# Patient Record
Sex: MALE | Race: WHITE | NOT HISPANIC OR LATINO | Employment: OTHER | URBAN - METROPOLITAN AREA
[De-identification: names, ages, dates, MRNs, and addresses within clinical notes are randomized per-mention and may not be internally consistent; named-entity substitution may affect disease eponyms.]

---

## 2018-08-31 ENCOUNTER — HOSPITAL ENCOUNTER (EMERGENCY)
Facility: HOSPITAL | Age: 73
Discharge: HOME | End: 2018-08-31
Attending: EMERGENCY MEDICINE | Admitting: EMERGENCY MEDICINE
Payer: MEDICARE

## 2018-08-31 ENCOUNTER — APPOINTMENT (EMERGENCY)
Dept: RADIOLOGY | Facility: HOSPITAL | Age: 73
End: 2018-08-31
Attending: EMERGENCY MEDICINE
Payer: MEDICARE

## 2018-08-31 VITALS
WEIGHT: 195 LBS | RESPIRATION RATE: 20 BRPM | OXYGEN SATURATION: 95 % | SYSTOLIC BLOOD PRESSURE: 149 MMHG | BODY MASS INDEX: 26.41 KG/M2 | DIASTOLIC BLOOD PRESSURE: 81 MMHG | TEMPERATURE: 98.5 F | HEART RATE: 95 BPM | HEIGHT: 72 IN

## 2018-08-31 DIAGNOSIS — R07.9 CHEST PAIN, UNSPECIFIED TYPE: Primary | ICD-10-CM

## 2018-08-31 PROBLEM — K86.2 PANCREATIC CYST: Status: ACTIVE | Noted: 2018-08-31

## 2018-08-31 PROBLEM — R91.1 PULMONARY NODULE: Status: ACTIVE | Noted: 2018-08-31

## 2018-08-31 PROBLEM — I10 HYPERTENSION: Status: ACTIVE | Noted: 2018-08-31

## 2018-08-31 PROBLEM — E13.9 DIABETES 1.5, MANAGED AS TYPE 2 (CMS/HCC): Status: ACTIVE | Noted: 2018-08-31

## 2018-08-31 PROBLEM — I48.91 ATRIAL FIBRILLATION (CMS/HCC): Status: ACTIVE | Noted: 2018-08-31

## 2018-08-31 PROBLEM — R07.81 PLEURITIC CHEST PAIN: Status: ACTIVE | Noted: 2018-08-31

## 2018-08-31 PROBLEM — K21.9 GERD (GASTROESOPHAGEAL REFLUX DISEASE): Status: ACTIVE | Noted: 2018-08-31

## 2018-08-31 LAB
ALBUMIN SERPL-MCNC: 4.5 G/DL (ref 3.4–5)
ALP SERPL-CCNC: 48 IU/L (ref 35–126)
ALT SERPL-CCNC: 32 IU/L (ref 16–63)
ANION GAP SERPL CALC-SCNC: 11 MEQ/L (ref 3–15)
APTT PPP: 32 SEC. (ref 23–35)
AST SERPL-CCNC: 34 IU/L (ref 15–41)
BASOPHILS # BLD: 0.04 K/UL (ref 0.01–0.1)
BASOPHILS NFR BLD: 0.4 %
BILIRUB DIRECT SERPL-MCNC: 0.2 MG/DL
BILIRUB SERPL-MCNC: 0.8 MG/DL (ref 0.3–1.2)
BNP SERPL-MCNC: 264 PG/ML
BUN SERPL-MCNC: 23 MG/DL (ref 8–20)
CALCIUM SERPL-MCNC: 9.2 MG/DL (ref 8.9–10.3)
CHLORIDE SERPL-SCNC: 103 MMOL/L (ref 98–109)
CO2 SERPL-SCNC: 24 MMOL/L (ref 22–32)
CREAT SERPL-MCNC: 1.4 MG/DL (ref 0.8–1.3)
DIFFERENTIAL METHOD BLD: ABNORMAL
EOSINOPHIL # BLD: 0.14 K/UL (ref 0.04–0.54)
EOSINOPHIL NFR BLD: 1.3 %
ERYTHROCYTE [DISTWIDTH] IN BLOOD BY AUTOMATED COUNT: 12.6 % (ref 11.6–14.4)
GFR SERPL CREATININE-BSD FRML MDRD: 49.4 ML/MIN/1.73M*2
GLUCOSE SERPL-MCNC: 179 MG/DL (ref 70–99)
HCT VFR BLDCO AUTO: 42.9 % (ref 40.1–51)
HGB BLD-MCNC: 14.9 G/DL (ref 13.7–17.5)
IMM GRANULOCYTES # BLD AUTO: 0.05 K/UL (ref 0–0.08)
IMM GRANULOCYTES NFR BLD AUTO: 0.5 %
INR PPP: 1 INR
LIPASE SERPL-CCNC: 47 U/L (ref 20–51)
LYMPHOCYTES # BLD: 1.67 K/UL (ref 1.2–3.5)
LYMPHOCYTES NFR BLD: 15.8 %
MCH RBC QN AUTO: 33.8 PG (ref 28–33.2)
MCHC RBC AUTO-ENTMCNC: 34.7 G/DL (ref 32.2–36.5)
MCV RBC AUTO: 97.3 FL (ref 83–98)
MONOCYTES # BLD: 1.14 K/UL (ref 0.3–1)
MONOCYTES NFR BLD: 10.8 %
NEUTROPHILS # BLD: 7.56 K/UL (ref 1.7–7)
NEUTS SEG NFR BLD: 71.2 %
NRBC BLD-RTO: 0 %
PDW BLD AUTO: 10.9 FL (ref 9.4–12.4)
PLATELET # BLD AUTO: 184 K/UL (ref 150–350)
POTASSIUM SERPL-SCNC: 4.1 MMOL/L (ref 3.6–5.1)
PROT SERPL-MCNC: 7.6 G/DL (ref 6–8.2)
PROTHROMBIN TIME: 13.3 SEC. (ref 12.2–14.5)
RBC # BLD AUTO: 4.41 M/UL (ref 4.5–5.8)
SODIUM SERPL-SCNC: 138 MMOL/L (ref 136–144)
TROPONIN I SERPL-MCNC: <0.02 NG/ML
TROPONIN I SERPL-MCNC: <0.02 NG/ML
WBC # BLD AUTO: 10.6 K/UL (ref 3.8–10.5)

## 2018-08-31 PROCEDURE — 99285 EMERGENCY DEPT VISIT HI MDM: CPT | Mod: 25

## 2018-08-31 PROCEDURE — 99285 EMERGENCY DEPT VISIT HI MDM: CPT | Performed by: HOSPITALIST

## 2018-08-31 PROCEDURE — 63600105 HC IODINE BASED CONTRAST: Performed by: EMERGENCY MEDICINE

## 2018-08-31 PROCEDURE — 71275 CT ANGIOGRAPHY CHEST: CPT

## 2018-08-31 PROCEDURE — 85025 COMPLETE CBC W/AUTO DIFF WBC: CPT | Performed by: EMERGENCY MEDICINE

## 2018-08-31 PROCEDURE — 84484 ASSAY OF TROPONIN QUANT: CPT | Performed by: EMERGENCY MEDICINE

## 2018-08-31 PROCEDURE — 96374 THER/PROPH/DIAG INJ IV PUSH: CPT

## 2018-08-31 PROCEDURE — 85610 PROTHROMBIN TIME: CPT | Performed by: EMERGENCY MEDICINE

## 2018-08-31 PROCEDURE — 80048 BASIC METABOLIC PNL TOTAL CA: CPT | Performed by: EMERGENCY MEDICINE

## 2018-08-31 PROCEDURE — 85730 THROMBOPLASTIN TIME PARTIAL: CPT | Performed by: EMERGENCY MEDICINE

## 2018-08-31 PROCEDURE — 83880 ASSAY OF NATRIURETIC PEPTIDE: CPT | Performed by: EMERGENCY MEDICINE

## 2018-08-31 PROCEDURE — 83690 ASSAY OF LIPASE: CPT | Performed by: EMERGENCY MEDICINE

## 2018-08-31 PROCEDURE — 36415 COLL VENOUS BLD VENIPUNCTURE: CPT | Performed by: EMERGENCY MEDICINE

## 2018-08-31 PROCEDURE — 71046 X-RAY EXAM CHEST 2 VIEWS: CPT

## 2018-08-31 PROCEDURE — 63600000 HC DRUGS/DETAIL CODE: Performed by: EMERGENCY MEDICINE

## 2018-08-31 PROCEDURE — 93005 ELECTROCARDIOGRAM TRACING: CPT | Performed by: EMERGENCY MEDICINE

## 2018-08-31 PROCEDURE — 3E033GC INTRODUCTION OF OTHER THERAPEUTIC SUBSTANCE INTO PERIPHERAL VEIN, PERCUTANEOUS APPROACH: ICD-10-PCS | Performed by: EMERGENCY MEDICINE

## 2018-08-31 PROCEDURE — 82248 BILIRUBIN DIRECT: CPT | Performed by: EMERGENCY MEDICINE

## 2018-08-31 PROCEDURE — 74174 CTA ABD&PLVS W/CONTRAST: CPT

## 2018-08-31 RX ORDER — ALFUZOSIN HYDROCHLORIDE 10 MG/1
10 TABLET, EXTENDED RELEASE ORAL NIGHTLY
COMMUNITY

## 2018-08-31 RX ORDER — VALSARTAN 160 MG/1
320 TABLET ORAL DAILY
COMMUNITY

## 2018-08-31 RX ORDER — ATORVASTATIN CALCIUM 20 MG/1
20 TABLET, FILM COATED ORAL NIGHTLY
COMMUNITY

## 2018-08-31 RX ORDER — KETOROLAC TROMETHAMINE 15 MG/ML
15 INJECTION, SOLUTION INTRAMUSCULAR; INTRAVENOUS ONCE
Status: COMPLETED | OUTPATIENT
Start: 2018-08-31 | End: 2018-08-31

## 2018-08-31 RX ORDER — METFORMIN HYDROCHLORIDE 500 MG/1
1000 TABLET ORAL 2 TIMES DAILY WITH MEALS
COMMUNITY

## 2018-08-31 RX ORDER — TADALAFIL 5 MG/1
20 TABLET ORAL DAILY PRN
COMMUNITY

## 2018-08-31 RX ORDER — HYDROMORPHONE HYDROCHLORIDE 1 MG/ML
0.5 INJECTION, SOLUTION INTRAMUSCULAR; INTRAVENOUS; SUBCUTANEOUS ONCE
Status: COMPLETED | OUTPATIENT
Start: 2018-08-31 | End: 2018-08-31

## 2018-08-31 RX ORDER — FINASTERIDE 5 MG/1
5 TABLET, FILM COATED ORAL DAILY
COMMUNITY

## 2018-08-31 RX ORDER — CYCLOBENZAPRINE HCL 10 MG
10 TABLET ORAL 2 TIMES DAILY PRN
Qty: 10 TABLET | Refills: 0 | Status: SHIPPED | OUTPATIENT
Start: 2018-08-31

## 2018-08-31 RX ORDER — OMEPRAZOLE 20 MG/1
40 CAPSULE, DELAYED RELEASE ORAL
COMMUNITY

## 2018-08-31 RX ORDER — AMLODIPINE BESYLATE 2.5 MG/1
7.5 TABLET ORAL DAILY
COMMUNITY

## 2018-08-31 RX ORDER — AZITHROMYCIN 250 MG/1
TABLET, FILM COATED ORAL
Qty: 6 TABLET | Refills: 0 | Status: SHIPPED | OUTPATIENT
Start: 2018-08-31

## 2018-08-31 RX ADMIN — HYDROMORPHONE HYDROCHLORIDE 0.5 MG: 1 INJECTION, SOLUTION INTRAMUSCULAR; INTRAVENOUS; SUBCUTANEOUS at 12:16

## 2018-08-31 RX ADMIN — KETOROLAC TROMETHAMINE 15 MG: 15 INJECTION, SOLUTION INTRAMUSCULAR; INTRAVENOUS at 16:36

## 2018-08-31 RX ADMIN — IOHEXOL 128 ML: 300 INJECTION, SOLUTION INTRAVENOUS at 13:44

## 2018-08-31 ASSESSMENT — ENCOUNTER SYMPTOMS
HEADACHES: 0
ABDOMINAL PAIN: 0
SHORTNESS OF BREATH: 1
DYSURIA: 0

## 2018-08-31 NOTE — ASSESSMENT & PLAN NOTE
Suspect costochondritis/ MSK due to tenderness to palpation along sternal border. Recommend short course of NSAIDS (caution given renal insufficiency, would not continue > 5 days) and muscle relaxer such as flexeril  Given abnormal CT findings, cannot rule out early /mild bronchitis, could also rx azithromycin x 5 days to be safe   If pain continues pt should follow up with cardiology for echo, r/o pericarditis or effusion  Would repeat troponin prior to discharge but low suspicion for ACS  Please give pt incentive spirometer to take home. I discussed importance of deep breaths to prevent pneumonia

## 2018-08-31 NOTE — CONSULTS
"   Hospital Medicine Service -  Inpatient Consultation         Requesting Physician: Dr Daigle    Reason for Consultation: Medical comanagement     HISTORY OF PRESENT ILLNESS        This is a 73 y.o. male with pmh HTN, AF on eliquis, BPH, DM who is visiting family in [place], [place] from [place] who presents with acute onset of pleuritic- type chest pain. Pt reports he was swimming yesterday and had sudden sharp pain in his chest that he attributed to the cold water. The pain subsided but was present when he woke up this morning. He denies dyspnea but says \"the pain makes me feel short of breath because I cant take deep breaths\". His wife was concerned about his heart and so brought him to the ED. Currently he reports pain that is sharp located along R sternal border only with deep breaths or movement. He denies cough, fever, chills, rhinorrhea, sore throat or indigestion. He is supposed to drive back to [place] tomorrow with his wife. He denies chest pain in the past. He has no known CAD and has never had stress test that he can remember. He does see a cardiologist Dr Sellers in [facility].     PAST MEDICAL AND SURGICAL HISTORY        Past Medical History:   Diagnosis Date   • Atrial fibrillation (CMS/HCC) (Formerly [facility])    • Hypertension    • Lipid disorder    • Type 2 diabetes mellitus (CMS/HCC) (Formerly [facility])        Past Surgical History:   Procedure Laterality Date   • BACK SURGERY     • HERNIA REPAIR     • TONSILLECTOMY         PCP: Mahamed Dunham    MEDICATIONS        Home Medications:    (Not in a hospital admission)    Current inpatient medications were personally reviewed.    ALLERGIES        Patient has no known allergies.    FAMILY HISTORY        Family History   Problem Relation Age of Onset   • Frontotemporal dementia Mother    • Diabetes Mother    • Rheumatic fever Father        SOCIAL HISTORY        Social History     Social History   • Marital status:      Spouse name: N/A   • Number of children: N/A   • Years of education: " N/A     Social History Main Topics   • Smoking status: Former Smoker     Packs/day: 1.00     Years: 14.00     Quit date: 1971   • Smokeless tobacco: Never Used      Comment: quit 1971   • Alcohol use Yes      Comment: social glass of champagne or gin/tonic   • Drug use: Unknown   • Sexual activity: Not Asked     Other Topics Concern   • None     Social History Narrative   • None       REVIEW OF SYSTEMS        All other systems reviewed and negative except as noted in HPI    PHYSICAL EXAMINATION        BP (!) 173/95 (BP Location: Right upper arm, Patient Position: Sitting)   Pulse 95   Temp 36.8 °C (98.3 °F) (Tympanic)   Resp (!) 22   Ht 1.829 m (6')   Wt 88.5 kg (195 lb)   SpO2 95%   BMI 26.45 kg/m²   Body mass index is 26.45 kg/m².  No intake or output data in the 24 hours ending 08/31/18 1654    Physical Exam   Constitutional: No distress.   HENT:   Head: Normocephalic and atraumatic.   Neck: Neck supple.   Cardiovascular: Normal rate and normal heart sounds.    Pulmonary/Chest: Effort normal. No respiratory distress.   Abdominal: Soft. Bowel sounds are normal. There is no tenderness.   Musculoskeletal: He exhibits no edema.   Very TTP along R sternal border   Neurological: He is alert.   Skin: Skin is warm and dry. No rash noted.   Nursing note and vitals reviewed.      LABS / EKG        Labs  I have reviewed the patient's labs to the time of note. No new clinical concern.    ECG/Telemetry  I have independently reviewed the ECG. Significant findings include AF rate in 90s.    Imaging  I have independently reviewed the pertinent imaging from the last 24 hrs.    ASSESSMENT AND RECOMMENDATIONS           Pleuritic chest pain   Assessment & Plan    Suspect costochondritis/ MSK due to tenderness to palpation along sternal border. Recommend short course of NSAIDS (caution given renal insufficiency, would not continue > 5 days) and muscle relaxer such as flexeril  Given abnormal CT findings, cannot rule out early  /mild bronchitis, could also rx azithromycin x 5 days to be safe   If pain continues pt should follow up with cardiology for echo, r/o pericarditis or effusion  Would repeat troponin prior to discharge but low suspicion for ACS  Please give pt incentive spirometer to take home. I discussed importance of deep breaths to prevent pneumonia        Pulmonary nodule   Assessment & Plan    Elevated risk given hx tobacco use. Recommend follow up CT in 12 months. Discussed with pt and wife        Pancreatic cyst   Assessment & Plan    Outpatient MRI discussed with pt and wife        Diabetes 1.5, managed as type 2 (CMS/[facility]) ([facility])   Assessment & Plan    Continue outpatient meds - metformin and januvia  Recommend caution with use of metformin with renal insufficiency/ CKD. Pt to follow up with PCP        GERD (gastroesophageal reflux disease)   Assessment & Plan    On both PPI and H2 blocker - continue        Hypertension   Assessment & Plan    Cont amlodipine, diovan        Atrial fibrillation (CMS/[facility]) ([facility])   Assessment & Plan    Rate controlled  On judd Silverman,   8/31/2018

## 2018-08-31 NOTE — ASSESSMENT & PLAN NOTE
Elevated risk given hx tobacco use. Recommend follow up CT in 12 months. Discussed with pt and wife

## 2018-08-31 NOTE — ASSESSMENT & PLAN NOTE
Continue outpatient meds - metformin and januvia  Recommend caution with use of metformin with renal insufficiency/ CKD. Pt to follow up with PCP

## 2018-08-31 NOTE — ED PROVIDER NOTES
HPI     Chief Complaint   Patient presents with   • Chest Pain     R sided cp with breathing, sob       Pt woke this morning with pain in right chest and feeling sob.  Last night, he jumped in a cold pool, took a sharp breath in, then got out, but didn't have symptoms then.  Pain feels worse with deep breath and moving around.  CP is right sided and constant.        History provided by:  Patient  Chest Pain   Pain location:  R chest  Pain quality: sharp    Pain radiates to:  Does not radiate  Pain severity:  Moderate  Onset quality:  Unable to specify  Duration:  1 day  Timing:  Constant  Progression:  Unchanged  Chronicity:  New  Context: breathing    Relieved by:  Nothing  Worsened by:  Deep breathing  Associated symptoms: shortness of breath    Associated symptoms: no abdominal pain and no headache         Patient History     Past Medical History:   Diagnosis Date   • Atrial fibrillation (CMS/HCC) ([facility])    • Hypertension    • Lipid disorder    • Type 2 diabetes mellitus (CMS/HCC) ([facility])        Past Surgical History:   Procedure Laterality Date   • BACK SURGERY     • HERNIA REPAIR     • TONSILLECTOMY         Family History   Problem Relation Age of Onset   • Frontotemporal dementia Mother    • Diabetes Mother    • Rheumatic fever Father        Social History   Substance Use Topics   • Smoking status: Former Smoker     Packs/day: 1.00     Years: 14.00     Quit date: 1971   • Smokeless tobacco: Never Used      Comment: quit 1971   • Alcohol use Yes      Comment: social glass of champagne or gin/tonic       Systems Reviewed from Nursing Triage:  Tobacco  Allergies  Meds  Problems  Med Hx  Surg Hx  Soc Hx         Review of Systems     Review of Systems   Respiratory: Positive for shortness of breath.    Cardiovascular: Positive for chest pain.   Gastrointestinal: Negative for abdominal pain.   Genitourinary: Negative for dysuria.   Neurological: Negative for headaches.   All other systems reviewed and are  negative.       Physical Exam     ED Triage Vitals   Temp Heart Rate Resp BP SpO2   08/31/18 1025 08/31/18 1025 08/31/18 1025 08/31/18 1211 08/31/18 1025   36.8 °C (98.3 °F) (!) 115 (!) 22 (!) 160/72 95 %      Temp Source Heart Rate Source Patient Position BP Location FiO2 (%) (Set)   08/31/18 1025 08/31/18 1211 08/31/18 1025 08/31/18 1025 --   Tympanic Monitor Lying Right upper arm        Pulse Ox %: 94 % (08/31/18 1602)  Pulse Ox Interpretation: Normal (08/31/18 1602)  Heart Rate: 95 (08/31/18 1602)  Rhythm Strip Interpretation: Atrial Flutter (08/31/18 1602)    Patient Vitals for the past 24 hrs:   BP Temp Temp src Pulse Resp SpO2 Height Weight   08/31/18 1812 (!) 149/81 36.9 °C (98.5 °F) Oral - 20 - - -   08/31/18 1636 (!) 173/95 - - - (!) 22 95 % - -   08/31/18 1405 (!) 180/84 - - - (!) 24 94 % - -   08/31/18 1211 (!) 160/72 - - 95 (!) 24 94 % - -   08/31/18 1025 - 36.8 °C (98.3 °F) Tympanic (!) 115 (!) 22 95 % 1.829 m (6') 88.5 kg (195 lb)           Physical Exam   Constitutional: He is oriented to person, place, and time. He appears well-developed and well-nourished.   HENT:   Head: Normocephalic and atraumatic.   Eyes: Conjunctivae are normal.   Neck: Normal range of motion.   Cardiovascular: An irregularly irregular rhythm present. Tachycardia present.    Pulmonary/Chest:   Decreased BS BL   Abdominal: Soft. There is tenderness (mild diffuse, no guarding).   Musculoskeletal: Normal range of motion. He exhibits no edema or tenderness.   Neurological: He is alert and oriented to person, place, and time.   Skin: Skin is warm and dry.   Psychiatric: He has a normal mood and affect.   Nursing note and vitals reviewed.           ECG 12 lead  Date/Time: 8/31/2018 2:50 PM  Performed by: CARMELO GARCIA  Authorized by: CARMELO GARCIA     Rate:     ECG rate:  93    ECG rate assessment: normal    Rhythm:     Rhythm: atrial flutter    QRS:     QRS axis:  Normal    QRS intervals:  Normal  Conduction:      Conduction: abnormal      Abnormal conduction: incomplete RBBB    ST segments:     ST segments:  Normal  T waves:     T waves: normal      ECG 12 lead  Date/Time: 8/31/2018 2:51 PM  Performed by: CARMELO GARCIA  Authorized by: CARMELO GARCIA     Rate:     ECG rate:  115    ECG rate assessment: tachycardic    Rhythm:     Rhythm: atrial flutter    QRS:     QRS axis:  Normal    QRS intervals:  Normal  Conduction:     Conduction: abnormal      Abnormal conduction: incomplete RBBB    ST segments:     ST segments:  Normal  T waves:     T waves: normal          ED Course & MDM     Labs Reviewed   BASIC METABOLIC PANEL - Abnormal        Result Value    Sodium 138      Potassium 4.1      Chloride 103      CO2 24      BUN 23 (*)     Creatinine 1.4 (*)     Glucose 179 (*)     Calcium 9.2      eGFR 49.4 (*)     Anion Gap 11     B-TYPE NATRIURETIC PEPTIDE - Abnormal      (*)    CBC - Abnormal     WBC 10.60 (*)     RBC 4.41 (*)     Hemoglobin 14.9      Hematocrit 42.9      MCV 97.3      MCH 33.8 (*)     MCHC 34.7      RDW 12.6      Platelets 184      MPV 10.9     DIFF COUNT - Abnormal     Differential Type Auto      nRBC 0.0      Immature Granulocytes 0.5      Neutrophils 71.2      Lymphocytes 15.8      Monocytes 10.8      Eosinophils 1.3      Basophils 0.4      Immature Granulocytes, Absolute 0.05      Neutrophils, Absolute 7.56 (*)     Lymphocytes, Absolute 1.67      Monocytes, Absolute 1.14 (*)     Eosinophils, Absolute 0.14      Basophils, Absolute 0.04     TROPONIN I - Normal    Troponin I <0.02     PTT - Normal    PTT 32     PROTIME-INR - Normal    PT 13.3      INR 1.0     HEPATIC FUNCTION PANEL - Normal    Albumin 4.5      Bilirubin, Total 0.8      Bilirubin, Direct 0.2      Alkaline Phosphatase 48      AST (SGOT) 34      ALT (SGPT) 32      Total Protein 7.6     LIPASE - Normal    Lipase 47     TROPONIN I - Normal    Troponin I <0.02     CBC AND DIFFERENTIAL    Narrative:     The following orders were  created for panel order CBC and Differential.  Procedure                               Abnormality         Status                     ---------                               -----------         ------                     CBC[[id number]]                           Abnormal            Final result               Diff Count[[id number]]                    Abnormal            Final result                 Please view results for these tests on the individual orders.       CT ANGIOGRAM CHEST WITH AND WITHOUT IV CONTRAST   Final Result   IMPRESSION:   1.  No evidence of pulmonary embolism or aortic dissection.   2.  Minimal peribronchial thickening in the lower lobes should be correlated for   mild bronchitis.   3.  Small pulmonary nodules measuring up to 4 mm.  Please see recommendations   below.   4.  There is a 0.9 cm cystic lesion in the ventral pancreatic body for which   follow-up elective MRI abdomen is suggested for further assessment.   5.  No evidence of acute inflammatory or obstructive process in the abdomen or   pelvis.   6.  Additional findings discussed below.      --------------------------------------------------------------   Fleischner Society 2017 Guidelines for Management of Incidentally Detected   Pulmonary Nodules in Adults. (Solid Nodules)      Nodule size <6 mm (single)   Low-Risk Patient - No routine follow-up   High-Risk Patient - Optional CT at 12 months      Nodule size < 6 mm (multiple)   Low-Risk Patient - No routine follow-up   High-Risk Patient - Optional CT at 12 months   --------------------------------------------------------------      COMMENT:      Comparison: None      Technique:  CT angiogram of the chest abdomen and pelvis was performed without   and with intravenous contrast.  120 cc of Omnipaque 350 was administered without   event. 3D MIP and/or volume rendered image reconstruction performed and   reviewed.   CT DOSE:  One or more dose reduction techniques (e.g. automated exposure    control, adjustment of the mA and/or kV according to patient size, use of   iterative reconstruction technique) utilized for this examination.      FINDINGS:   CHEST      LUNG AND LARGE AIRWAYS: Possible bronchi are clear.  There is minimal   peribronchial thickening in the lower lobes and mild basilar dependent   hypoventilatory/atelectatic change.  4 mm lung nodule in the left upper lobe on   image 48.  2 mm nodule in the right upper lobe on image 57.   PLEURA: within normal limits.   VESSELS: There is coronary atherosclerotic disease there is no hyperdense mural   hematoma on the noncontrast series.  The thoracic aorta is top normal in caliber   measuring about 3.8 cm.  There is no evidence of an aortic dissection.  There is   a common origin of the innominate and left common carotid artery from the aortic   arch.  No pulmonary embolism.   HEART: Mildly enlarged. No pericardial effusion.   MEDIASTINUM AND FLORENCE: within normal limits.   CHEST WALL AND LOWER NECK: within normal limits.      ABDOMEN:   LIVER: within normal limits.   BILE DUCTS: normal caliber.   GALLBLADDER: No calcified gallstones. Normal caliber wall.   PANCREAS: 0.9 cm low density lesion in the ventral pancreatic body on arterial   phase image 126.  Otherwise within normal limits.   SPLEEN: within normal limits.   ADRENALS: within normal limits.   KIDNEYS: Small 1.1 cm hypoattenuating focus in the right kidney lower pole is   probably a cyst and demonstrates simple fluid Hounsfield units on noncontrast   series.  A small size makes characterization difficult.  There is also a larger   posterior exophytic cyst from the left kidney measuring about 6.7 cm.      PELVIS:   REPRODUCTIVE ORGANS: Slightly enlarged prostate.  No evidence of a pelvic mass.   URETERS: within normal limits.   BLADDER: within normal limits.         BOWEL: There is clot diverticulosis without diverticulitis.  The sigmoid colon   is somewhat elongated and tortuous.  There is  no evidence of a sigmoid volvulus.   Appendix is normal.  No suspicious findings at the terminal ileum. No enlarged   mesenteric lymph nodes.   PERITONEUM: no ascites or free air, no fluid collection.   VESSELS: Atherosclerotic disease.  Normal caliber of the abdominal aorta.  No   abdominal aortic dissection.  There is a moderate degree of narrowing at the   celiac artery origin which is probably due to median arcuate ligament   compression.  There is minimal poststenotic dilatation.  Otherwise cervical   arteries patent.  There is standard hepatic arterial anatomy.  There is also   patency of the major remaining imaged vasculature.   RETROPERITONEUM: within normal limits.   ABDOMINAL WALL: Small fat-containing right inguinal hernia as well as bilateral   lipomatous hypertrophy of the spermatic cords.   BONES: There are degenerative changes.  Postsurgical changes in the lumbar spine   .  No suspicious bone findings.               I certify that I have reviewed this examination and agree with this report.   Willy Scales MD      CTA ABD AND PELVIS WITH AND WITHOUT CON   Final Result   IMPRESSION:   1.  No evidence of pulmonary embolism or aortic dissection.   2.  Minimal peribronchial thickening in the lower lobes should be correlated for   mild bronchitis.   3.  Small pulmonary nodules measuring up to 4 mm.  Please see recommendations   below.   4.  There is a 0.9 cm cystic lesion in the ventral pancreatic body for which   follow-up elective MRI abdomen is suggested for further assessment.   5.  No evidence of acute inflammatory or obstructive process in the abdomen or   pelvis.   6.  Additional findings discussed below.      --------------------------------------------------------------   Fleischner Society 2017 Guidelines for Management of Incidentally Detected   Pulmonary Nodules in Adults. (Solid Nodules)      Nodule size <6 mm (single)   Low-Risk Patient - No routine follow-up   High-Risk Patient - Optional  CT at 12 months      Nodule size < 6 mm (multiple)   Low-Risk Patient - No routine follow-up   High-Risk Patient - Optional CT at 12 months   --------------------------------------------------------------      COMMENT:      Comparison: None      Technique:  CT angiogram of the chest abdomen and pelvis was performed without   and with intravenous contrast.  120 cc of Omnipaque 350 was administered without   event. 3D MIP and/or volume rendered image reconstruction performed and   reviewed.   CT DOSE:  One or more dose reduction techniques (e.g. automated exposure   control, adjustment of the mA and/or kV according to patient size, use of   iterative reconstruction technique) utilized for this examination.      FINDINGS:   CHEST      LUNG AND LARGE AIRWAYS: Possible bronchi are clear.  There is minimal   peribronchial thickening in the lower lobes and mild basilar dependent   hypoventilatory/atelectatic change.  4 mm lung nodule in the left upper lobe on   image 48.  2 mm nodule in the right upper lobe on image 57.   PLEURA: within normal limits.   VESSELS: There is coronary atherosclerotic disease there is no hyperdense mural   hematoma on the noncontrast series.  The thoracic aorta is top normal in caliber   measuring about 3.8 cm.  There is no evidence of an aortic dissection.  There is   a common origin of the innominate and left common carotid artery from the aortic   arch.  No pulmonary embolism.   HEART: Mildly enlarged. No pericardial effusion.   MEDIASTINUM AND FLORENCE: within normal limits.   CHEST WALL AND LOWER NECK: within normal limits.      ABDOMEN:   LIVER: within normal limits.   BILE DUCTS: normal caliber.   GALLBLADDER: No calcified gallstones. Normal caliber wall.   PANCREAS: 0.9 cm low density lesion in the ventral pancreatic body on arterial   phase image 126.  Otherwise within normal limits.   SPLEEN: within normal limits.   ADRENALS: within normal limits.   KIDNEYS: Small 1.1 cm hypoattenuating  focus in the right kidney lower pole is   probably a cyst and demonstrates simple fluid Hounsfield units on noncontrast   series.  A small size makes characterization difficult.  There is also a larger   posterior exophytic cyst from the left kidney measuring about 6.7 cm.      PELVIS:   REPRODUCTIVE ORGANS: Slightly enlarged prostate.  No evidence of a pelvic mass.   URETERS: within normal limits.   BLADDER: within normal limits.         BOWEL: There is clot diverticulosis without diverticulitis.  The sigmoid colon   is somewhat elongated and tortuous.  There is no evidence of a sigmoid volvulus.   Appendix is normal.  No suspicious findings at the terminal ileum. No enlarged   mesenteric lymph nodes.   PERITONEUM: no ascites or free air, no fluid collection.   VESSELS: Atherosclerotic disease.  Normal caliber of the abdominal aorta.  No   abdominal aortic dissection.  There is a moderate degree of narrowing at the   celiac artery origin which is probably due to median arcuate ligament   compression.  There is minimal poststenotic dilatation.  Otherwise cervical   arteries patent.  There is standard hepatic arterial anatomy.  There is also   patency of the major remaining imaged vasculature.   RETROPERITONEUM: within normal limits.   ABDOMINAL WALL: Small fat-containing right inguinal hernia as well as bilateral   lipomatous hypertrophy of the spermatic cords.   BONES: There are degenerative changes.  Postsurgical changes in the lumbar spine   .  No suspicious bone findings.               I certify that I have reviewed this examination and agree with this report.   Willy Scales MD      X-RAY CHEST 2 VIEWS   Final Result   IMPRESSION: No definite active disease.      ECG 12 lead    (Results Pending)   ECG 12 lead    (Results Pending)           [facility]         ED Course as of Aug 31 2006   Fri Aug 31, 2018   1213 PT feels the same, pain going across his right shoulder/trap now.  Will get a CT for RO of dissection and  PE  [WS]   1402 Pt back from CT. Dilaudid helped his pain  [WS]   1451 Repeat EKG unchanged.  Unclear cause to pt's cp, sob.  [facility], will see in consult  [WS]   1554 [facility], feels this is muscular.  Will check repeat trop, treat with NSAIDs, muscle relaxer, Zpack.  Pt also to get incentive spirometry and to fu with pcp when gets home for incidental CT findings.    [WS]      ED Course User Index  [WS] Aj Daigle MD         Clinical Impressions as of Aug 31 2006   Chest pain, unspecified type        Aj Daigle MD  08/31/18 2008

## 2018-09-02 LAB
ATRIAL RATE: 122
ATRIAL RATE: 365
QRS DURATION: 110
QRS DURATION: 110
QT INTERVAL: 340
QT INTERVAL: 352
QTC CALCULATION(BAZETT): 437
QTC CALCULATION(BAZETT): 470
R AXIS: -17
R AXIS: 2
T WAVE AXIS: 22
T WAVE AXIS: 28
VENTRICULAR RATE: 115
VENTRICULAR RATE: 93

## 2019-07-01 ENCOUNTER — APPOINTMENT (OUTPATIENT)
Dept: RADIOLOGY | Age: 74
End: 2019-07-01
Attending: EMERGENCY MEDICINE
Payer: MEDICARE

## 2019-07-01 ENCOUNTER — HOSPITAL ENCOUNTER (OUTPATIENT)
Facility: CLINIC | Age: 74
Discharge: HOME | End: 2019-07-01
Attending: EMERGENCY MEDICINE
Payer: MEDICARE

## 2019-07-01 VITALS
DIASTOLIC BLOOD PRESSURE: 86 MMHG | TEMPERATURE: 99.5 F | RESPIRATION RATE: 16 BRPM | HEART RATE: 73 BPM | SYSTOLIC BLOOD PRESSURE: 116 MMHG | OXYGEN SATURATION: 93 %

## 2019-07-01 DIAGNOSIS — J06.9 UPPER RESPIRATORY TRACT INFECTION, UNSPECIFIED TYPE: Primary | ICD-10-CM

## 2019-07-01 PROCEDURE — 99203 OFFICE O/P NEW LOW 30 MIN: CPT | Performed by: EMERGENCY MEDICINE

## 2019-07-01 PROCEDURE — 71046 X-RAY EXAM CHEST 2 VIEWS: CPT | Performed by: EMERGENCY MEDICINE

## 2019-07-01 RX ORDER — ALBUTEROL SULFATE 0.83 MG/ML
2.5 SOLUTION RESPIRATORY (INHALATION) EVERY 6 HOURS PRN
Status: DISCONTINUED | OUTPATIENT
Start: 2019-07-01 | End: 2019-07-01 | Stop reason: HOSPADM

## 2019-07-01 RX ORDER — ALBUTEROL SULFATE 90 UG/1
2 INHALANT RESPIRATORY (INHALATION) EVERY 6 HOURS PRN
Qty: 1 INHALER | Refills: 0 | Status: SHIPPED | OUTPATIENT
Start: 2019-07-01 | End: 2019-07-31

## 2019-07-01 RX ORDER — AZITHROMYCIN 250 MG/1
250 TABLET, FILM COATED ORAL DAILY
Qty: 6 TABLET | Refills: 0 | Status: SHIPPED | OUTPATIENT
Start: 2019-07-01 | End: 2019-07-06

## 2019-07-01 RX ADMIN — ALBUTEROL SULFATE 2.5 MG: 0.83 SOLUTION RESPIRATORY (INHALATION) at 20:41

## 2019-07-01 ASSESSMENT — ENCOUNTER SYMPTOMS
NAUSEA: 0
SORE THROAT: 0
SINUS PAIN: 0
SINUS PRESSURE: 0
SHORTNESS OF BREATH: 0
RHINORRHEA: 1
ARTHRALGIAS: 0
CHEST TIGHTNESS: 0
VOMITING: 0
HEMATURIA: 0
FEVER: 0
CHILLS: 0
HEADACHES: 0
BACK PAIN: 0
FATIGUE: 1
EYE PAIN: 0
PALPITATIONS: 0
ABDOMINAL PAIN: 0
COUGH: 1
COLOR CHANGE: 0
LIGHT-HEADEDNESS: 0
DYSURIA: 0
DIZZINESS: 0

## 2019-07-02 NOTE — ED PROVIDER NOTES
"History  Chief Complaint   Patient presents with   • Cough     74 yo male with a complicated past medical history including atrial fibrillation, hypertension, hyperlipidemia, and DM presents with URI symptoms x 4 days. The patient reports a cough productive of \"yellow-green\" sputum, rhinorrhea, nasal congestion, postnasal drip, and generalized fatigue. No earache or sore throat. He denies fevers/chills. (+) Occasional shortness of breath with exertion. No LE swelling/pain. No chest pain. No hemoptysis. He denies LE swelling/pain. (+) Recent plane trip to the . No identifiable sick contacts. No other complaints.            Past Medical History:   Diagnosis Date   • Atrial fibrillation (CMS/HCC)    • Hypertension    • Lipid disorder    • Type 2 diabetes mellitus (CMS/HCC) (HCC)        Past Surgical History:   Procedure Laterality Date   • BACK SURGERY     • HERNIA REPAIR     • TONSILLECTOMY         Family History   Problem Relation Age of Onset   • Frontotemporal dementia Mother    • Diabetes Mother    • Rheumatic fever Father        Social History   Substance Use Topics   • Smoking status: Former Smoker     Packs/day: 1.00     Years: 14.00     Quit date: 1971   • Smokeless tobacco: Never Used      Comment: quit 1971   • Alcohol use Yes      Comment: social glass of champagne or gin/tonic       Review of Systems   Constitutional: Positive for fatigue. Negative for chills and fever.   HENT: Positive for congestion, postnasal drip and rhinorrhea. Negative for ear pain, sinus pain, sinus pressure and sore throat.    Eyes: Negative for pain and visual disturbance.   Respiratory: Positive for cough. Negative for chest tightness and shortness of breath.    Cardiovascular: Negative for chest pain and palpitations.   Gastrointestinal: Negative for abdominal pain, nausea and vomiting.   Genitourinary: Negative for dysuria and hematuria.   Musculoskeletal: Negative for arthralgias and back pain.   Skin: Negative for color " "change and rash.   Neurological: Negative for dizziness, light-headedness and headaches.   All other systems reviewed and are negative.      Physical Exam  ED Triage Vitals [07/01/19 2016]   Temp Heart Rate Resp BP SpO2   37.5 °C (99.5 °F) 73 16 116/86 93 %      Temp src Heart Rate Source Patient Position BP Location FiO2 (%) (Set)   -- -- -- -- --       Physical Exam   Constitutional: He appears well-developed and well-nourished.   HENT:   Head: Normocephalic and atraumatic.   Right Ear: Tympanic membrane normal.   Left Ear: Tympanic membrane normal.   Nose: Right sinus exhibits no maxillary sinus tenderness and no frontal sinus tenderness. Left sinus exhibits no frontal sinus tenderness.   Mouth/Throat: Uvula is midline and mucous membranes are normal. Posterior oropharyngeal erythema present. No tonsillar exudate.   Eyes: Conjunctivae are normal.   Neck: Neck supple.   Cardiovascular: Normal rate and regular rhythm.    No murmur heard.  Pulmonary/Chest: Effort normal. No respiratory distress. He has no decreased breath sounds. He has wheezes. He has rhonchi. He has no rales.   Abdominal: Soft. There is no tenderness.   Musculoskeletal: He exhibits no edema.   Neurological: He is alert.   Skin: Skin is warm and dry.   Psychiatric: He has a normal mood and affect.   Nursing note and vitals reviewed.        Procedures  Procedures    UC Course  Clinical Impressions as of Jul 01 2110   Upper respiratory tract infection, unspecified type       MDM  Number of Diagnoses or Management Options  Upper respiratory tract infection, unspecified type:   Diagnosis management comments: The patient is well appearing but with a borderline temperature and a pulse ox of 93-97% on RA. (+) Scattered wheezes and rhonchi on exam. Will check a CXR and trial an albuterol neb.    21:00 CXR unremarkable. Lungs now CTA B/L and the patient says he feels \"much better\". Plan for a course of Zithromax, an albuterol MDI prn, and a cough " suppressant. The patient was instructed to follow up with his PCP later this week for reassessment. He was also encouraged to go to the ED if any of his symptoms worsen. He is agreeable to this plan. Strict return precautions provided.    Patient Progress  Patient progress: stable                 Ariel Raines MD  07/01/19 1611

## 2020-02-06 ENCOUNTER — OFFICE VISIT (OUTPATIENT)
Dept: PODIATRY | Facility: CLINIC | Age: 75
End: 2020-02-06
Payer: MEDICARE

## 2020-02-06 VITALS
DIASTOLIC BLOOD PRESSURE: 86 MMHG | SYSTOLIC BLOOD PRESSURE: 157 MMHG | HEART RATE: 88 BPM | BODY MASS INDEX: 26.41 KG/M2 | TEMPERATURE: 98 F | WEIGHT: 195 LBS | HEIGHT: 72 IN

## 2020-02-06 DIAGNOSIS — E11.9 TYPE 2 DIABETES MELLITUS WITHOUT COMPLICATION, WITHOUT LONG-TERM CURRENT USE OF INSULIN: Primary | ICD-10-CM

## 2020-02-06 DIAGNOSIS — M20.41 HAMMER TOE OF RIGHT FOOT: ICD-10-CM

## 2020-02-06 DIAGNOSIS — L97.511 SKIN ULCER OF RIGHT FOOT, LIMITED TO BREAKDOWN OF SKIN: ICD-10-CM

## 2020-02-06 PROCEDURE — 99203 OFFICE O/P NEW LOW 30 MIN: CPT | Mod: PBBFAC,PN | Performed by: PODIATRIST

## 2020-02-06 PROCEDURE — 99202 PR OFFICE/OUTPT VISIT, NEW, LEVL II, 15-29 MIN: ICD-10-PCS | Mod: S$PBB,,, | Performed by: PODIATRIST

## 2020-02-06 PROCEDURE — 99202 OFFICE O/P NEW SF 15 MIN: CPT | Mod: S$PBB,,, | Performed by: PODIATRIST

## 2020-02-06 PROCEDURE — 99999 PR PBB SHADOW E&M-NEW PATIENT-LVL III: ICD-10-PCS | Mod: PBBFAC,,, | Performed by: PODIATRIST

## 2020-02-06 PROCEDURE — 99999 PR PBB SHADOW E&M-NEW PATIENT-LVL III: CPT | Mod: PBBFAC,,, | Performed by: PODIATRIST

## 2020-02-06 RX ORDER — AMLODIPINE BESYLATE 2.5 MG/1
TABLET ORAL
COMMUNITY
Start: 2020-01-09 | End: 2022-06-28

## 2020-02-06 RX ORDER — ALFUZOSIN HYDROCHLORIDE 10 MG/1
10 TABLET, EXTENDED RELEASE ORAL
COMMUNITY
End: 2022-06-28

## 2020-02-06 RX ORDER — ALBUTEROL SULFATE 90 UG/1
2 AEROSOL, METERED RESPIRATORY (INHALATION) EVERY 6 HOURS PRN
COMMUNITY
Start: 2019-07-01 | End: 2022-01-25

## 2020-02-06 RX ORDER — TADALAFIL 5 MG/1
20 TABLET ORAL
COMMUNITY

## 2020-02-06 RX ORDER — METFORMIN HYDROCHLORIDE 500 MG/1
TABLET ORAL
COMMUNITY
Start: 2019-11-30 | End: 2022-06-28

## 2020-02-06 RX ORDER — ATORVASTATIN CALCIUM 20 MG/1
TABLET, FILM COATED ORAL
COMMUNITY

## 2020-02-06 RX ORDER — METFORMIN HYDROCHLORIDE 500 MG/1
TABLET ORAL
COMMUNITY
End: 2022-01-25 | Stop reason: SDUPTHER

## 2020-02-06 RX ORDER — OMEPRAZOLE 20 MG/1
40 CAPSULE, DELAYED RELEASE ORAL
COMMUNITY
End: 2022-01-25 | Stop reason: ALTCHOICE

## 2020-02-08 PROBLEM — E11.9 TYPE 2 DIABETES MELLITUS WITHOUT COMPLICATION, WITHOUT LONG-TERM CURRENT USE OF INSULIN: Status: ACTIVE | Noted: 2020-02-08

## 2020-02-08 PROBLEM — L97.511 SKIN ULCER OF RIGHT FOOT, LIMITED TO BREAKDOWN OF SKIN: Status: ACTIVE | Noted: 2020-02-08

## 2020-02-08 PROBLEM — M20.41 HAMMER TOE OF RIGHT FOOT: Status: ACTIVE | Noted: 2020-02-08

## 2020-02-09 NOTE — PROGRESS NOTES
Subjective:       Patient ID: Дмитрий Baca is a 74 y.o. male.    Chief Complaint: Follow-up; Diabetes Mellitus; and Callouses    Patient presents today for new patient diabetic evaluation he is also complaining of a painful 5th digit on the right foot he states he has had this since 1988 he has had this trimmed on multiple occasions and states that recently he has been able to keep it trimmed himself up until about 2-3 weeks ago he states it has become more more difficult for him to see and trim this area appropriately and he is concerned about possibly cutting himself with his diabetes.  Patient is very active he states he exercises on the treadmill daily.  Past Medical History:   Diagnosis Date    A-fib     Diabetes mellitus, type 2     Hypertension     Sleep apnea      Past Surgical History:   Procedure Laterality Date    colonscopy       History reviewed. No pertinent family history.  Social History     Socioeconomic History    Marital status:      Spouse name: Not on file    Number of children: Not on file    Years of education: Not on file    Highest education level: Not on file   Occupational History    Not on file   Social Needs    Financial resource strain: Not on file    Food insecurity:     Worry: Not on file     Inability: Not on file    Transportation needs:     Medical: Not on file     Non-medical: Not on file   Tobacco Use    Smoking status: Never Smoker    Smokeless tobacco: Never Used   Substance and Sexual Activity    Alcohol use: Yes     Frequency: Never     Drinks per session: 3 or 4     Binge frequency: Never    Drug use: Never    Sexual activity: Not Currently   Lifestyle    Physical activity:     Days per week: Not on file     Minutes per session: Not on file    Stress: Not on file   Relationships    Social connections:     Talks on phone: Not on file     Gets together: Not on file     Attends Christianity service: Not on file     Active member of club or  organization: Not on file     Attends meetings of clubs or organizations: Not on file     Relationship status: Not on file   Other Topics Concern    Not on file   Social History Narrative    Not on file       Current Outpatient Medications   Medication Sig Dispense Refill    albuterol (PROVENTIL/VENTOLIN HFA) 90 mcg/actuation inhaler Inhale 2 puffs into the lungs every 6 (six) hours as needed.      alfuzosin (UROXATRAL) 10 mg Tb24 Take 10 mg by mouth.      amLODIPine (NORVASC) 2.5 MG tablet       apixaban (ELIQUIS) 5 mg Tab Eliquis 5 mg tablet   Take 1 tablet twice a day by oral route.      atorvastatin (LIPITOR) 20 MG tablet atorvastatin 20 mg tablet   Take 1 tablet every day by oral route.      metFORMIN (GLUCOPHAGE) 500 MG tablet       metFORMIN (GLUCOPHAGE) 500 MG tablet metformin 500 mg tablet   Take 1 tablet twice a day by oral route.      omeprazole (PRILOSEC) 20 MG capsule Take 40 mg by mouth.      SITagliptin (JANUVIA) 50 MG Tab Januvia 50 mg tablet   Take 1 tablet every day by oral route.      tadalafil (CIALIS) 5 MG tablet Take 20 mg by mouth.       No current facility-administered medications for this visit.      Review of patient's allergies indicates:  No Known Allergies    Review of Systems   Musculoskeletal: Positive for arthralgias.   Skin: Positive for wound.   All other systems reviewed and are negative.      Objective:      Vitals:    02/06/20 1052   BP: (!) 157/86   Pulse: 88   Temp: 97.8 °F (36.6 °C)   Weight: 88.5 kg (195 lb)   Height: 6' (1.829 m)     Physical Exam   Constitutional: He appears well-developed and well-nourished.   Cardiovascular:   Pulses:       Dorsalis pedis pulses are 1+ on the right side, and 1+ on the left side.        Posterior tibial pulses are 1+ on the right side, and 1+ on the left side.   Pulmonary/Chest: Effort normal.   Musculoskeletal: He exhibits edema, tenderness and deformity.        Right foot: There is deformity.        Feet:    Feet:   Right  Foot:   Protective Sensation: 4 sites tested. 3 sites sensed.   Skin Integrity: Positive for ulcer, skin breakdown, callus and dry skin.   Left Foot:   Protective Sensation: 4 sites tested. 3 sites sensed.   Skin Integrity: Positive for dry skin.   Neurological: He is alert.   Skin: Capillary refill takes 2 to 3 seconds. There is erythema.   Psychiatric: He has a normal mood and affect. His behavior is normal. Judgment and thought content normal.   Nursing note and vitals reviewed.           Assessment:       1. Type 2 diabetes mellitus without complication, without long-term current use of insulin    2. Hammer toe of right foot    3. Skin ulcer of right foot, limited to breakdown of skin        Plan:       Patient presents today for new patient diabetic evaluation he is also complaining of a painful 5th digit on the right foot he states he has had this since 1988 he has had this trimmed on multiple occasions and states that recently he has been able to keep it trimmed himself up until about 2-3 weeks ago he states it has become more more difficult for him to see and trim this area appropriately and he is concerned about possibly cutting himself with his diabetes.  Patient is very active he states he exercises on the treadmill daily.  A complete new patient diabetic evaluation was performed patient has no significant diabetic neuropathy his protective sensation sharp dull and vibratory sensation is intact bilateral.  Patient is a type 2 diabetic for approximately 14 years he does have an ulcerative lesion on the distal medial aspect of the 5th digit right secondary to digital contracture this is not currently infected but is painful on examination.  Non excisional debridement was performed on this hyperkeratotic lesion pre ulcerative area this gave the patient considerable relief I dispensed the patient has several different types of gel pads that he could use to keep the toe  to prevent rubbing and  irritation certainly the patient should not be trimming this himself especially because of his diabetes this could lead to further complication or infection.  Patient was in understanding and agreement with this I have advised the patient to see how he does with the tip different types of pads and see what works best any increased redness swelling pain discomfort or drainage she should contact us immediately recommended follow-up is at least every 4-6 months for diabetic evaluation unless he has any problems prior to this.  Total face-to-face time including discussion evaluation and treatment equaled 25 min.This note was created using [employer] voice recognition software that occasionally misinterpreted phrases or words.

## 2021-06-03 ENCOUNTER — OFFICE VISIT (OUTPATIENT)
Dept: PODIATRY | Facility: CLINIC | Age: 76
End: 2021-06-03
Payer: MEDICARE

## 2021-06-03 VITALS
WEIGHT: 203 LBS | DIASTOLIC BLOOD PRESSURE: 79 MMHG | TEMPERATURE: 98 F | HEART RATE: 68 BPM | BODY MASS INDEX: 27.5 KG/M2 | HEIGHT: 72 IN | SYSTOLIC BLOOD PRESSURE: 143 MMHG

## 2021-06-03 DIAGNOSIS — L97.511 SKIN ULCER OF RIGHT FOOT, LIMITED TO BREAKDOWN OF SKIN: ICD-10-CM

## 2021-06-03 DIAGNOSIS — M72.2 PLANTAR FASCIITIS: ICD-10-CM

## 2021-06-03 DIAGNOSIS — M20.41 HAMMER TOE OF RIGHT FOOT: ICD-10-CM

## 2021-06-03 DIAGNOSIS — E11.9 TYPE 2 DIABETES MELLITUS WITHOUT COMPLICATION, WITHOUT LONG-TERM CURRENT USE OF INSULIN: Primary | ICD-10-CM

## 2021-06-03 DIAGNOSIS — E10.9 COMPREHENSIVE DIABETIC FOOT EXAMINATION, TYPE 1 DM, ENCOUNTER FOR: ICD-10-CM

## 2021-06-03 PROCEDURE — 99999 PR PBB SHADOW E&M-EST. PATIENT-LVL III: CPT | Mod: PBBFAC,,, | Performed by: PODIATRIST

## 2021-06-03 PROCEDURE — 99213 OFFICE O/P EST LOW 20 MIN: CPT | Mod: PBBFAC,PN | Performed by: PODIATRIST

## 2021-06-03 PROCEDURE — 99999 PR PBB SHADOW E&M-EST. PATIENT-LVL III: ICD-10-PCS | Mod: PBBFAC,,, | Performed by: PODIATRIST

## 2021-06-03 PROCEDURE — 99214 PR OFFICE/OUTPT VISIT, EST, LEVL IV, 30-39 MIN: ICD-10-PCS | Mod: S$PBB,,, | Performed by: PODIATRIST

## 2021-06-03 PROCEDURE — 99214 OFFICE O/P EST MOD 30 MIN: CPT | Mod: S$PBB,,, | Performed by: PODIATRIST

## 2021-06-03 RX ORDER — TELMISARTAN 80 MG/1
80 TABLET ORAL
COMMUNITY
Start: 2021-05-20 | End: 2022-01-25 | Stop reason: SDUPTHER

## 2021-06-03 RX ORDER — FINASTERIDE 5 MG/1
5 TABLET, FILM COATED ORAL DAILY
COMMUNITY
Start: 2021-05-19

## 2021-06-03 RX ORDER — FAMOTIDINE 20 MG/1
20 TABLET, FILM COATED ORAL 2 TIMES DAILY
COMMUNITY
Start: 2021-05-18

## 2021-06-03 RX ORDER — TELMISARTAN 40 MG/1
40 TABLET ORAL DAILY
COMMUNITY
Start: 2021-03-11 | End: 2022-01-25 | Stop reason: ALTCHOICE

## 2021-06-03 RX ORDER — TRIAMCINOLONE ACETONIDE 1 MG/G
CREAM TOPICAL
COMMUNITY
Start: 2021-01-21

## 2021-06-06 PROBLEM — E10.9 COMPREHENSIVE DIABETIC FOOT EXAMINATION, TYPE 1 DM, ENCOUNTER FOR: Status: ACTIVE | Noted: 2021-06-06

## 2021-06-06 PROBLEM — M72.2 PLANTAR FASCIITIS: Status: ACTIVE | Noted: 2021-06-06

## 2021-07-13 ENCOUNTER — OFFICE VISIT (OUTPATIENT)
Dept: PODIATRY | Facility: CLINIC | Age: 76
End: 2021-07-13
Payer: MEDICARE

## 2021-07-13 VITALS
TEMPERATURE: 98 F | BODY MASS INDEX: 27.09 KG/M2 | HEART RATE: 82 BPM | WEIGHT: 200 LBS | SYSTOLIC BLOOD PRESSURE: 147 MMHG | HEIGHT: 72 IN | DIASTOLIC BLOOD PRESSURE: 75 MMHG

## 2021-07-13 DIAGNOSIS — M72.2 PLANTAR FASCIITIS: Primary | ICD-10-CM

## 2021-07-13 DIAGNOSIS — E11.9 TYPE 2 DIABETES MELLITUS WITHOUT COMPLICATION, WITHOUT LONG-TERM CURRENT USE OF INSULIN: ICD-10-CM

## 2021-07-13 PROCEDURE — 99999 PR PBB SHADOW E&M-EST. PATIENT-LVL IV: CPT | Mod: PBBFAC,,, | Performed by: PODIATRIST

## 2021-07-13 PROCEDURE — 99214 OFFICE O/P EST MOD 30 MIN: CPT | Mod: PBBFAC,PN | Performed by: PODIATRIST

## 2021-07-13 PROCEDURE — 99213 OFFICE O/P EST LOW 20 MIN: CPT | Mod: S$PBB,,, | Performed by: PODIATRIST

## 2021-07-13 PROCEDURE — 99213 PR OFFICE/OUTPT VISIT, EST, LEVL III, 20-29 MIN: ICD-10-PCS | Mod: S$PBB,,, | Performed by: PODIATRIST

## 2021-07-13 PROCEDURE — 99999 PR PBB SHADOW E&M-EST. PATIENT-LVL IV: ICD-10-PCS | Mod: PBBFAC,,, | Performed by: PODIATRIST

## 2021-07-13 RX ORDER — TRAMADOL HYDROCHLORIDE 50 MG/1
TABLET ORAL
COMMUNITY
Start: 2020-10-27 | End: 2022-01-25

## 2021-08-24 ENCOUNTER — OFFICE VISIT (OUTPATIENT)
Dept: PODIATRY | Facility: CLINIC | Age: 76
End: 2021-08-24
Payer: MEDICARE

## 2021-08-24 VITALS
BODY MASS INDEX: 27.09 KG/M2 | HEART RATE: 67 BPM | TEMPERATURE: 98 F | SYSTOLIC BLOOD PRESSURE: 139 MMHG | WEIGHT: 200 LBS | DIASTOLIC BLOOD PRESSURE: 73 MMHG | HEIGHT: 72 IN

## 2021-08-24 DIAGNOSIS — E11.9 TYPE 2 DIABETES MELLITUS WITHOUT COMPLICATION, WITHOUT LONG-TERM CURRENT USE OF INSULIN: ICD-10-CM

## 2021-08-24 DIAGNOSIS — M72.2 PLANTAR FASCIITIS: Primary | ICD-10-CM

## 2021-08-24 PROCEDURE — 99213 OFFICE O/P EST LOW 20 MIN: CPT | Mod: S$PBB,,, | Performed by: PODIATRIST

## 2021-08-24 PROCEDURE — 99214 OFFICE O/P EST MOD 30 MIN: CPT | Mod: PBBFAC,PN | Performed by: PODIATRIST

## 2021-08-24 PROCEDURE — 99213 PR OFFICE/OUTPT VISIT, EST, LEVL III, 20-29 MIN: ICD-10-PCS | Mod: S$PBB,,, | Performed by: PODIATRIST

## 2021-08-24 PROCEDURE — 99999 PR PBB SHADOW E&M-EST. PATIENT-LVL IV: CPT | Mod: PBBFAC,,, | Performed by: PODIATRIST

## 2021-08-24 PROCEDURE — 99999 PR PBB SHADOW E&M-EST. PATIENT-LVL IV: ICD-10-PCS | Mod: PBBFAC,,, | Performed by: PODIATRIST

## 2022-01-24 ENCOUNTER — OFFICE VISIT (OUTPATIENT)
Dept: PODIATRY | Facility: CLINIC | Age: 77
End: 2022-01-24
Payer: MEDICARE

## 2022-01-24 VITALS
HEIGHT: 72 IN | BODY MASS INDEX: 27.09 KG/M2 | WEIGHT: 200 LBS | SYSTOLIC BLOOD PRESSURE: 150 MMHG | DIASTOLIC BLOOD PRESSURE: 79 MMHG | RESPIRATION RATE: 18 BRPM | HEART RATE: 80 BPM

## 2022-01-24 DIAGNOSIS — L60.0 INGROWN NAIL: Primary | ICD-10-CM

## 2022-01-24 DIAGNOSIS — E11.9 TYPE 2 DIABETES MELLITUS WITHOUT COMPLICATION, WITHOUT LONG-TERM CURRENT USE OF INSULIN: ICD-10-CM

## 2022-01-24 DIAGNOSIS — M72.2 PLANTAR FASCIITIS: ICD-10-CM

## 2022-01-24 DIAGNOSIS — E10.9 COMPREHENSIVE DIABETIC FOOT EXAMINATION, TYPE 1 DM, ENCOUNTER FOR: ICD-10-CM

## 2022-01-24 PROCEDURE — 99214 OFFICE O/P EST MOD 30 MIN: CPT | Mod: S$PBB,,, | Performed by: PODIATRIST

## 2022-01-24 PROCEDURE — 99215 OFFICE O/P EST HI 40 MIN: CPT | Mod: PBBFAC | Performed by: PODIATRIST

## 2022-01-24 PROCEDURE — 99214 PR OFFICE/OUTPT VISIT, EST, LEVL IV, 30-39 MIN: ICD-10-PCS | Mod: S$PBB,,, | Performed by: PODIATRIST

## 2022-01-24 PROCEDURE — 99999 PR PBB SHADOW E&M-EST. PATIENT-LVL V: CPT | Mod: PBBFAC,,, | Performed by: PODIATRIST

## 2022-01-24 PROCEDURE — 99999 PR PBB SHADOW E&M-EST. PATIENT-LVL V: ICD-10-PCS | Mod: PBBFAC,,, | Performed by: PODIATRIST

## 2022-01-24 RX ORDER — DICLOFENAC SODIUM 10 MG/G
1 GEL TOPICAL
COMMUNITY
Start: 2021-10-28 | End: 2022-10-28

## 2022-01-24 RX ORDER — CHOLECALCIFEROL (VITAMIN D3) 25 MCG
1000 TABLET ORAL 2 TIMES DAILY
COMMUNITY

## 2022-01-24 RX ORDER — ALFUZOSIN HYDROCHLORIDE 10 MG/1
TABLET, EXTENDED RELEASE ORAL
COMMUNITY
Start: 2021-06-06 | End: 2022-01-25 | Stop reason: SDUPTHER

## 2022-01-24 RX ORDER — ATORVASTATIN CALCIUM 20 MG/1
1 TABLET, FILM COATED ORAL NIGHTLY
COMMUNITY
Start: 2021-08-12 | End: 2022-01-25 | Stop reason: SDUPTHER

## 2022-01-24 RX ORDER — TELMISARTAN 80 MG/1
1 TABLET ORAL DAILY
COMMUNITY
Start: 2021-05-20 | End: 2022-06-28

## 2022-01-24 RX ORDER — ZINC GLUCONATE 50 MG
50 TABLET ORAL DAILY
COMMUNITY
End: 2022-01-25 | Stop reason: SDUPTHER

## 2022-01-24 RX ORDER — TADALAFIL 20 MG/1
20 TABLET ORAL DAILY
COMMUNITY

## 2022-01-24 RX ORDER — ASCORBIC ACID 500 MG
500 TABLET ORAL DAILY
COMMUNITY

## 2022-01-24 RX ORDER — VITAMIN B COMPLEX
1 CAPSULE ORAL DAILY
COMMUNITY

## 2022-01-24 RX ORDER — METFORMIN HYDROCHLORIDE 500 MG/1
1 TABLET ORAL DAILY
COMMUNITY
Start: 2021-04-19 | End: 2022-04-19

## 2022-01-24 RX ORDER — AMLODIPINE BESYLATE 2.5 MG/1
2.5 TABLET ORAL
COMMUNITY
Start: 2021-05-20 | End: 2022-01-25 | Stop reason: SDUPTHER

## 2022-01-24 RX ORDER — FINASTERIDE 5 MG/1
1 TABLET, FILM COATED ORAL DAILY
COMMUNITY
Start: 2021-03-10 | End: 2022-01-25 | Stop reason: SDUPTHER

## 2022-01-24 RX ORDER — FAMOTIDINE 20 MG/1
1 TABLET, FILM COATED ORAL 2 TIMES DAILY
COMMUNITY
Start: 2021-02-01 | End: 2022-01-25 | Stop reason: SDUPTHER

## 2022-01-24 RX ORDER — OMEPRAZOLE 40 MG/1
CAPSULE, DELAYED RELEASE ORAL
COMMUNITY
Start: 2021-12-03

## 2022-01-24 RX ORDER — AZITHROMYCIN 500 MG/1
500 TABLET, FILM COATED ORAL DAILY
COMMUNITY
Start: 2021-12-03 | End: 2022-01-25

## 2022-01-24 RX ORDER — DICLOFENAC SODIUM 10 MG/G
GEL TOPICAL
COMMUNITY
Start: 2021-10-28 | End: 2022-01-25 | Stop reason: SDUPTHER

## 2022-01-25 PROBLEM — L60.0 INGROWN NAIL: Status: ACTIVE | Noted: 2022-01-25

## 2022-01-25 NOTE — PROGRESS NOTES
Subjective:       Patient ID: Дмитрий Baca is a 76 y.o. male.    Chief Complaint: Nail Problem, Diabetes Mellitus, and Callouses  Patient presents today he is a type 2 diabetic I have previously treated the patient for plantar fasciitis which he states is completely resolved and gone.  Patient is complaining of an ingrown toenail it has been bothering him for a few months on the right great toe he also has a painful area on the 5th toe right foot.    Past Medical History:   Diagnosis Date    A-fib     Diabetes mellitus, type 2     Hypertension     Sleep apnea      Past Surgical History:   Procedure Laterality Date    colonscopy       History reviewed. No pertinent family history.  Social History     Socioeconomic History    Marital status:    Tobacco Use    Smoking status: Never Smoker    Smokeless tobacco: Never Used   Substance and Sexual Activity    Alcohol use: Yes    Drug use: Never    Sexual activity: Not Currently       Current Outpatient Medications   Medication Sig Dispense Refill    alfuzosin (UROXATRAL) 10 mg Tb24 Take 10 mg by mouth.      amLODIPine (NORVASC) 2.5 MG tablet       apixaban (ELIQUIS) 5 mg Tab Eliquis 5 mg tablet   Take 1 tablet twice a day by oral route.      ascorbic acid, vitamin C, (VITAMIN C) 500 MG tablet Take 500 mg by mouth once daily.      atorvastatin (LIPITOR) 20 MG tablet atorvastatin 20 mg tablet   Take 1 tablet every day by oral route.      b complex vitamins capsule Take 1 capsule by mouth once daily.      diclofenac sodium (VOLTAREN) 1 % Gel Apply 1 % topically.      famotidine (PEPCID) 20 MG tablet Take 20 mg by mouth 2 (two) times daily.      finasteride (PROSCAR) 5 mg tablet Take 5 mg by mouth once daily.      metFORMIN (GLUCOPHAGE) 500 MG tablet       metFORMIN (GLUCOPHAGE) 500 MG tablet Take 1 tablet by mouth once daily.      omeprazole (PRILOSEC) 40 MG capsule   0 Refill(s), Maintenance      SITagliptin (JANUVIA) 50 MG Tab Take 1  tablet by mouth once daily.      tadalafiL (CIALIS) 20 MG Tab Take 20 mg by mouth once daily.      tadalafil (CIALIS) 5 MG tablet Take 20 mg by mouth.      telmisartan (MICARDIS) 80 MG Tab Take 1 tablet by mouth once daily.      triamcinolone acetonide 0.1% (KENALOG) 0.1 % cream SMARTSI Application Topical 2-3 Times Daily      vit C/E/Zn/coppr/lutein/zeaxan (PRESERVISION AREDS-2 ORAL) Take 1 tablet by mouth once daily.      vitamin D (VITAMIN D3) 1000 units Tab Take 1,000 Units by mouth 2 (two) times a day.      zinc acetate 25 mg (zinc) Cap   2 caps, Oral, Daily, on an empty stomach 1 hour before or 2 hours after eating, # 250 cap, 0 Refill(s), Maintenance       No current facility-administered medications for this visit.     Review of patient's allergies indicates:  No Known Allergies    Review of Systems   Musculoskeletal: Positive for arthralgias.   Skin: Positive for wound.   All other systems reviewed and are negative.      Objective:      Vitals:    22 0907   BP: (!) 150/79   Pulse: 80   Resp: 18   Weight: 90.7 kg (200 lb)   Height: 6' (1.829 m)     Physical Exam  Vitals and nursing note reviewed.   Constitutional:       Appearance: Normal appearance. He is well-developed.   Cardiovascular:      Pulses:           Dorsalis pedis pulses are 1+ on the right side and 1+ on the left side.        Posterior tibial pulses are 1+ on the right side and 1+ on the left side.   Pulmonary:      Effort: Pulmonary effort is normal.   Musculoskeletal:         General: Tenderness present.        Feet:    Feet:      Right foot:      Protective Sensation: 4 sites tested. 3 sites sensed.      Skin integrity: Ulcer and dry skin present.      Toenail Condition: Right toenails are ingrown.      Left foot:      Protective Sensation: 4 sites tested. 3 sites sensed.      Skin integrity: Dry skin present.   Skin:     Capillary Refill: Capillary refill takes 2 to 3 seconds.      Findings: Erythema present.   Neurological:       General: No focal deficit present.      Mental Status: He is alert.   Psychiatric:         Mood and Affect: Mood normal.         Behavior: Behavior normal.         Thought Content: Thought content normal.         Judgment: Judgment normal.                                              Assessment:       1. Ingrown nail    2. Plantar fasciitis    3. Type 2 diabetes mellitus without complication, without long-term current use of insulin    4. Comprehensive diabetic foot examination, type 1 DM, encounter for        Plan:       Patient presents today he is a type 2 diabetic I have previously treated the patient for plantar fasciitis which he states is completely resolved and gone.  Patient is complaining of an ingrown toenail it has been bothering him for a few months on the right great toe he also has a painful area on the 5th toe right foot.  Comprehensive diabetic evaluation was performed patient does have ingrowing toenail along the lateral border of the patient's right great toe he states he had been getting pedicures but clearly they have not trim the nail out in this area I was able to trim and remove almost the entire lateral border of the right great toe which was ingrown and putting the patient at risk for infection patient noted immediate relief following debridement bacitracin ointment and a light dressing applied patient advised if this is not pain-free over the next 4-5 days to contact us for follow-up further evaluation and treatment.  Patient also had a wound 5th digit right this is an area where he has had a previous ulceration on the medial aspect of the 5th digit I was able to non excisionally debride the area I have also dispensed the patient some additional TO a post pads to use in the area as opposed to the phone pads he had been using typically the Silipos pads last longer and work better.  Recommended follow-up will be as needed.  This note was created using [employer] voice recognition software  that occasionally misinterpreted phrases or words.

## 2022-06-28 ENCOUNTER — OFFICE VISIT (OUTPATIENT)
Dept: PODIATRY | Facility: CLINIC | Age: 77
End: 2022-06-28
Payer: MEDICARE

## 2022-06-28 VITALS
TEMPERATURE: 98 F | WEIGHT: 200 LBS | HEART RATE: 58 BPM | BODY MASS INDEX: 27.09 KG/M2 | DIASTOLIC BLOOD PRESSURE: 71 MMHG | SYSTOLIC BLOOD PRESSURE: 145 MMHG | HEIGHT: 72 IN

## 2022-06-28 DIAGNOSIS — L97.521 ULCER OF LEFT FOOT, LIMITED TO BREAKDOWN OF SKIN: Primary | ICD-10-CM

## 2022-06-28 DIAGNOSIS — E11.9 TYPE 2 DIABETES MELLITUS WITHOUT COMPLICATION, WITHOUT LONG-TERM CURRENT USE OF INSULIN: ICD-10-CM

## 2022-06-28 DIAGNOSIS — M72.2 PLANTAR FASCIITIS: ICD-10-CM

## 2022-06-28 DIAGNOSIS — E10.9 COMPREHENSIVE DIABETIC FOOT EXAMINATION, TYPE 1 DM, ENCOUNTER FOR: ICD-10-CM

## 2022-06-28 PROCEDURE — 99999 PR PBB SHADOW E&M-EST. PATIENT-LVL IV: ICD-10-PCS | Mod: PBBFAC,,, | Performed by: PODIATRIST

## 2022-06-28 PROCEDURE — 99213 PR OFFICE/OUTPT VISIT, EST, LEVL III, 20-29 MIN: ICD-10-PCS | Mod: S$PBB,,, | Performed by: PODIATRIST

## 2022-06-28 PROCEDURE — 99214 OFFICE O/P EST MOD 30 MIN: CPT | Mod: PBBFAC,PN | Performed by: PODIATRIST

## 2022-06-28 PROCEDURE — 99999 PR PBB SHADOW E&M-EST. PATIENT-LVL IV: CPT | Mod: PBBFAC,,, | Performed by: PODIATRIST

## 2022-06-28 PROCEDURE — 99213 OFFICE O/P EST LOW 20 MIN: CPT | Mod: S$PBB,,, | Performed by: PODIATRIST

## 2022-06-28 RX ORDER — UREA 40 %
CREAM (GRAM) TOPICAL 2 TIMES DAILY
Qty: 85 G | Refills: 6 | Status: SHIPPED | OUTPATIENT
Start: 2022-06-28 | End: 2022-07-28

## 2022-06-28 RX ORDER — EMPAGLIFLOZIN 10 MG/1
10 TABLET, FILM COATED ORAL
COMMUNITY
Start: 2022-05-08

## 2022-06-28 RX ORDER — METFORMIN HYDROCHLORIDE 500 MG/1
500 TABLET ORAL
COMMUNITY
Start: 2022-04-13

## 2022-06-28 RX ORDER — TELMISARTAN 80 MG/1
80 TABLET ORAL
COMMUNITY
Start: 2022-05-05 | End: 2023-04-30

## 2022-06-28 RX ORDER — AMLODIPINE BESYLATE 2.5 MG/1
TABLET ORAL
COMMUNITY
Start: 2022-04-03

## 2022-07-01 ENCOUNTER — TELEPHONE (OUTPATIENT)
Dept: PODIATRY | Facility: CLINIC | Age: 77
End: 2022-07-01
Payer: MEDICARE

## 2022-07-01 NOTE — TELEPHONE ENCOUNTER
----- Message from Tarun Melendez DPM sent at 6/30/2022  5:17 PM CDT -----  Contact: pt  It is okay to start out with a 20% urea if this is not proving to be effective we may have to use some other means to get the patient is a 40%.  ----- Message -----  From: Yenni Lawrence LPN  Sent: 6/30/2022   3:03 PM CDT  To: Tarun Melendez DPM    Patient could only find Urea 20 % cream. Patient wants to know if this will be effective? Please advise.   ----- Message -----  From: Ingrid Lawrence  Sent: 6/30/2022   2:55 PM CDT  To: [facility] Staff    Type: Needs Medical Advice    Who Called: pt  Best Call Back Number: [phone]    Inquiry/Question: pt was given a 40 percent cream yesterday and his pharmacy only has a 20 percent. Please call pt and advise     Thank you~

## 2022-07-03 NOTE — PROGRESS NOTES
Subjective:       Patient ID: Дмитрий Baca is a 76 y.o. male.    Chief Complaint: Foot Pain, Follow-up, Diabetes Mellitus, and Callouses  Patient presents today he is a type 2 diabetic I have previously treated the patient for plantar fasciitis which he states is completely resolved and gone.  Patient is complaining of an ingrown toenail it has been bothering him for a few months on the right great toe he also has a painful area on the 5th toe right foot.    Past Medical History:   Diagnosis Date    A-fib     Diabetes mellitus, type 2     Hypertension     Sleep apnea      Past Surgical History:   Procedure Laterality Date    colonscopy       History reviewed. No pertinent family history.  Social History     Socioeconomic History    Marital status:    Tobacco Use    Smoking status: Never Smoker    Smokeless tobacco: Never Used   Substance and Sexual Activity    Alcohol use: Yes    Drug use: Never    Sexual activity: Not Currently       Current Outpatient Medications   Medication Sig Dispense Refill    amLODIPine (NORVASC) 2.5 MG tablet   = 3 tab, Oral, Daily, # 270 tab, 3 Refill(s), Pharmacy: [facility], 181, cm, 03/09/22 8:18:00 CST, Height/Length Measured, 94, kg, 03/09/22 8:18:00 CST, Weight Dosing      apixaban (ELIQUIS) 5 mg Tab 5 mg.      ascorbic acid, vitamin C, (VITAMIN C) 500 MG tablet Take 500 mg by mouth once daily.      atorvastatin (LIPITOR) 20 MG tablet atorvastatin 20 mg tablet   Take 1 tablet every day by oral route.      b complex vitamins capsule Take 1 capsule by mouth once daily.      diclofenac sodium (VOLTAREN) 1 % Gel Apply 1 % topically.      empagliflozin (JARDIANCE) 10 mg tablet 10 mg.      famotidine (PEPCID) 20 MG tablet Take 20 mg by mouth 2 (two) times daily.      finasteride (PROSCAR) 5 mg tablet Take 5 mg by mouth once daily.      metFORMIN (GLUCOPHAGE) 500 MG tablet 500 mg.      omeprazole (PRILOSEC) 40 MG capsule   0 Refill(s),  Maintenance      SITagliptin (JANUVIA) 50 MG Tab Take 1 tablet by mouth once daily.      tadalafiL (CIALIS) 20 MG Tab Take 20 mg by mouth once daily.      tadalafil (CIALIS) 5 MG tablet Take 20 mg by mouth.      telmisartan (MICARDIS) 80 MG Tab 80 mg.      triamcinolone acetonide 0.1% (KENALOG) 0.1 % cream SMARTSI Application Topical 2-3 Times Daily      vit C/E/Zn/coppr/lutein/zeaxan (PRESERVISION AREDS-2 ORAL) Take 1 tablet by mouth once daily.      vitamin D (VITAMIN D3) 1000 units Tab Take 1,000 Units by mouth 2 (two) times a day.      zinc acetate 25 mg (zinc) Cap   2 caps, Oral, Daily, on an empty stomach 1 hour before or 2 hours after eating, # 250 cap, 0 Refill(s), Maintenance      urea (CARMOL) 40 % Crea Apply topically 2 (two) times daily. 85 g 6     No current facility-administered medications for this visit.     Review of patient's allergies indicates:  No Known Allergies    Review of Systems   Musculoskeletal: Positive for arthralgias.   Skin: Positive for wound.   All other systems reviewed and are negative.      Objective:      Vitals:    22 1317   BP: (!) 145/71   Pulse: (!) 58   Temp: 98.1 °F (36.7 °C)   Weight: 90.7 kg (200 lb)   Height: 6' (1.829 m)     Physical Exam  Vitals and nursing note reviewed.   Constitutional:       Appearance: Normal appearance. He is well-developed.   Cardiovascular:      Pulses:           Dorsalis pedis pulses are 1+ on the right side and 1+ on the left side.        Posterior tibial pulses are 1+ on the right side and 1+ on the left side.   Pulmonary:      Effort: Pulmonary effort is normal.   Musculoskeletal:         General: Tenderness present.        Feet:    Feet:      Right foot:      Protective Sensation: 4 sites tested. 3 sites sensed.      Skin integrity: Dry skin present. No ulcer.      Left foot:      Protective Sensation: 4 sites tested. 3 sites sensed.      Skin integrity: Ulcer, callus and dry skin present.   Skin:     Capillary Refill:  Capillary refill takes 2 to 3 seconds.      Findings: Erythema present.   Neurological:      General: No focal deficit present.      Mental Status: He is alert.   Psychiatric:         Mood and Affect: Mood normal.         Behavior: Behavior normal.         Thought Content: Thought content normal.         Judgment: Judgment normal.                                                  Assessment:       1. Ulcer of left foot, limited to breakdown of skin    2. Type 2 diabetes mellitus without complication, without long-term current use of insulin    3. Comprehensive diabetic foot examination, type 1 DM, encounter for    4. Plantar fasciitis        Plan:       Patient presents today he is a type 2 diabetic I have previously treated the patient for plantar fasciitis which he states is completely resolved and gone.  Patient is complaining of an ingrown toenail it has been bothering him for a few months on the right great toe he also has a painful area on the 5th toe right foot.  Comprehensive diabetic evaluation was performed patient does have ingrowing toenail along the lateral border of the patient's right great toe he states he had been getting pedicures but clearly they have not trim the nail out in this area I was able to trim and remove almost the entire lateral border of the right great toe which was ingrown and putting the patient at risk for infection patient noted immediate relief following debridement bacitracin ointment and a light dressing applied patient advised if this is not pain-free over the next 4-5 days to contact us for follow-up further evaluation and treatment.  Patient also had a wound 5th digit right this is an area where he has had a previous ulceration on the medial aspect of the 5th digit I was able to non excisionally debride the area I have also dispensed the patient some additional TO a post pads to use in the area as opposed to the phone pads he had been using typically the Silipos pads last  longer and work better.  Patient has a new area of hyperkeratotic tissue underlying the 5th metatarsal head which is consistent with an IPK left this is pre ulcerative in nature.  Patient was walking in [place] on his feet quite a bit and he states that this started about 3 months ago and has bothered him ever since.  Following debridement of the area I have recommended the application of a 40% urea cream daily patient was dispensed information for the cream as well as a prescription he subsequently was only able to get 20% urea through the .  Recommended follow-up will be as needed.  This note was created using [employer] voice recognition software that occasionally misinterpreted phrases or words.

## 2024-09-17 ENCOUNTER — OFFICE VISIT (OUTPATIENT)
Dept: PODIATRY | Facility: CLINIC | Age: 79
End: 2024-09-17
Payer: MEDICARE

## 2024-09-17 VITALS
BODY MASS INDEX: 27.09 KG/M2 | DIASTOLIC BLOOD PRESSURE: 65 MMHG | SYSTOLIC BLOOD PRESSURE: 154 MMHG | HEART RATE: 77 BPM | HEIGHT: 72 IN | WEIGHT: 200 LBS

## 2024-09-17 DIAGNOSIS — M84.374A STRESS FRACTURE OF RIGHT FOOT, INITIAL ENCOUNTER: Primary | ICD-10-CM

## 2024-09-17 DIAGNOSIS — M76.70 PERONEAL TENDONITIS, UNSPECIFIED LATERALITY: ICD-10-CM

## 2024-09-17 DIAGNOSIS — E10.9 COMPREHENSIVE DIABETIC FOOT EXAMINATION, TYPE 1 DM, ENCOUNTER FOR: ICD-10-CM

## 2024-09-17 DIAGNOSIS — E11.9 TYPE 2 DIABETES MELLITUS WITHOUT COMPLICATION, WITHOUT LONG-TERM CURRENT USE OF INSULIN: ICD-10-CM

## 2024-09-17 PROCEDURE — 99215 OFFICE O/P EST HI 40 MIN: CPT | Mod: PBBFAC,PN | Performed by: PODIATRIST

## 2024-09-17 PROCEDURE — 99999 PR PBB SHADOW E&M-EST. PATIENT-LVL V: CPT | Mod: PBBFAC,,, | Performed by: PODIATRIST

## 2024-09-17 PROCEDURE — 99214 OFFICE O/P EST MOD 30 MIN: CPT | Mod: S$PBB,,, | Performed by: PODIATRIST

## 2024-09-17 RX ORDER — ALFUZOSIN HYDROCHLORIDE 10 MG/1
TABLET, EXTENDED RELEASE ORAL
COMMUNITY
Start: 2023-10-05 | End: 2024-10-04

## 2024-09-17 RX ORDER — DULAGLUTIDE 0.75 MG/.5ML
INJECTION, SOLUTION SUBCUTANEOUS
COMMUNITY
Start: 2024-05-21 | End: 2025-02-21

## 2024-09-17 RX ORDER — FUROSEMIDE 40 MG/1
TABLET ORAL
COMMUNITY
Start: 2024-02-08 | End: 2025-02-07

## 2024-09-17 RX ORDER — AMLODIPINE BESYLATE 5 MG/1
TABLET ORAL
COMMUNITY
Start: 2023-11-16 | End: 2024-11-14

## 2024-09-17 RX ORDER — HYDRALAZINE HYDROCHLORIDE 25 MG/1
TABLET, FILM COATED ORAL
COMMUNITY
Start: 2023-11-16 | End: 2025-05-17

## 2024-09-17 RX ORDER — DILTIAZEM HYDROCHLORIDE 60 MG/1
TABLET, FILM COATED ORAL
COMMUNITY
Start: 2024-08-02

## 2024-09-18 ENCOUNTER — PATIENT MESSAGE (OUTPATIENT)
Dept: PODIATRY | Facility: CLINIC | Age: 79
End: 2024-09-18
Payer: MEDICARE

## 2024-09-18 ENCOUNTER — HOSPITAL ENCOUNTER (OUTPATIENT)
Dept: RADIOLOGY | Facility: HOSPITAL | Age: 79
Discharge: HOME | End: 2024-09-18
Attending: PODIATRIST
Payer: MEDICARE

## 2024-09-18 DIAGNOSIS — M84.374A STRESS FRACTURE OF RIGHT FOOT, INITIAL ENCOUNTER: ICD-10-CM

## 2024-09-18 PROCEDURE — 73630 X-RAY EXAM OF FOOT: CPT | Mod: 26,RT,, | Performed by: RADIOLOGY

## 2024-09-18 PROCEDURE — 73630 X-RAY EXAM OF FOOT: CPT | Mod: TC,PN,RT

## 2024-09-21 NOTE — PROGRESS NOTES
Subjective:       Patient ID: Дмитрий Baca is a 79 y.o. male.    Chief Complaint: Mass (Right foot)  Patient presents today he is a type 2 diabetic I have previously treated the patient for plantar fasciitis which he states is completely resolved and gone.  Patient was last seen about 2-1/2 years ago he relates he has been having pain and inflammation on the outside of his right foot for about 1 month patient relates no trauma no injury to the area.    Past Medical History:   Diagnosis Date    A-fib     Diabetes mellitus, type 2     Hypertension     Sleep apnea      Past Surgical History:   Procedure Laterality Date    colonscopy       No family history on file.  Social History     Socioeconomic History    Marital status:    Tobacco Use    Smoking status: Never    Smokeless tobacco: Never   Substance and Sexual Activity    Alcohol use: Yes    Drug use: Never    Sexual activity: Not Currently       Current Outpatient Medications   Medication Sig Dispense Refill    alfuzosin (UROXATRAL) 10 mg Tb24 Take with food/milk.Take or use exactly as directed.Obtain advice for OTCs.May cause drowsiness/dizziness.May impair driving.Swallow whole.      amLODIPine (NORVASC) 5 MG tablet Be careful if taking OTCs.Take or use exactly as directed.      apixaban (ELIQUIS) 5 mg Tab 5 mg.      ascorbic acid, vitamin C, (VITAMIN C) 500 MG tablet Take 500 mg by mouth once daily.      atorvastatin (LIPITOR) 20 MG tablet atorvastatin 20 mg tablet   Take 1 tablet every day by oral route.      b complex vitamins capsule Take 1 capsule by mouth once daily.      diclofenac sodium (VOLTAREN) 1 % Gel Apply 1 % topically.      empagliflozin (JARDIANCE) 10 mg tablet 10 mg.      famotidine (PEPCID) 20 MG tablet Take 20 mg by mouth 2 (two) times daily.      finasteride (PROSCAR) 5 mg tablet Take 5 mg by mouth once daily.      furosemide (LASIX) 40 MG tablet Take orange juice or banana.Avoid exposure to sun.Take or use exactly as directed.       hydrALAZINE (APRESOLINE) 25 MG tablet Be careful if taking OTCs.Take or use exactly as directed.      omeprazole (PRILOSEC) 40 MG capsule   0 Refill(s), Maintenance      SYMBICORT 80-4.5 mcg/actuation HFAA Inhale into the lungs.      tadalafiL (CIALIS) 20 MG Tab Take 20 mg by mouth once daily.      telmisartan (MICARDIS) 80 MG Tab 80 mg.      triamcinolone acetonide 0.1% (KENALOG) 0.1 % cream SMARTSI Application Topical 2-3 Times Daily      TRULICITY 0.75 mg/0.5 mL pen injector refrigerateCheck with your doctor before becoming pregnant.Store in original package.      vit C/E/Zn/coppr/lutein/zeaxan (PRESERVISION AREDS-2 ORAL) Take 1 tablet by mouth once daily.      vitamin D (VITAMIN D3) 1000 units Tab Take 1,000 Units by mouth 2 (two) times a day.      zinc acetate 25 mg (zinc) Cap   2 caps, Oral, Daily, on an empty stomach 1 hour before or 2 hours after eating, # 250 cap, 0 Refill(s), Maintenance       No current facility-administered medications for this visit.     Review of patient's allergies indicates:  No Known Allergies    Review of Systems   Musculoskeletal:  Positive for arthralgias.   All other systems reviewed and are negative.      Objective:      Vitals:    24 1538   BP: (!) 154/65   BP Location: Right arm   Patient Position: Sitting   Pulse: 77   Weight: 90.7 kg (200 lb)   Height: 6' (1.829 m)     Physical Exam  Vitals and nursing note reviewed.   Constitutional:       Appearance: Normal appearance. He is well-developed.   Cardiovascular:      Pulses:           Dorsalis pedis pulses are 1+ on the right side and 1+ on the left side.        Posterior tibial pulses are 1+ on the right side and 1+ on the left side.   Pulmonary:      Effort: Pulmonary effort is normal.   Musculoskeletal:         General: Tenderness present.      Right foot: Deformity present.        Feet:    Feet:      Right foot:      Protective Sensation: 4 sites tested.  3 sites sensed.      Skin integrity: Erythema and dry  skin present. No ulcer.      Left foot:      Protective Sensation: 4 sites tested.  3 sites sensed.      Skin integrity: Dry skin present.   Skin:     Capillary Refill: Capillary refill takes 2 to 3 seconds.      Findings: Erythema present.   Neurological:      General: No focal deficit present.      Mental Status: He is alert.   Psychiatric:         Mood and Affect: Mood normal.         Behavior: Behavior normal.         Thought Content: Thought content normal.         Judgment: Judgment normal.                                                        Assessment:       1. Stress fracture of right foot, initial encounter    2. Peroneal tendonitis, unspecified laterality    3. Type 2 diabetes mellitus without complication, without long-term current use of insulin    4. Comprehensive diabetic foot examination, type 1 DM, encounter for        Plan:        Patient presents today he is a type 2 diabetic I have previously treated the patient for plantar fasciitis which he states is completely resolved and gone.  Patient was last seen about 2-1/2 years ago he relates he has been having pain and inflammation on the outside of his right foot for about 1 month patient relates no trauma no injury to the area.  On evaluation patient has positive erythema positive edema overlying the styloid process base of the 5th metatarsal right has discomfort upon palpation with some callus tissue around this area findings are consistent with peroneal tendinitis however I can not exclude the possibility of a stress fracture.  Plain film x-rays were ordered evaluated patient was made aware that there is no sign of stress fracture he does have a small fractured spur fragment at the insertion of the peroneus brevis it is unclear as to whether not this is acute or chronic.  Patient will start applying Voltaren gel to the area 4 times per day he is to discontinue all barefoot walking and needs to use the arch pads that I dispensed to him today I  advised the patient keeping pressure off this area having appropriate support is the most important thing when you trying to get peroneal tendinitis settled down.  Patient will be seen for follow-up in 2 weeks patient was dispensed additional blue arch pads for the right side I showed the patient where these go in his shoe I will re-evaluate him at that time if he has any problems questions concerns or should his condition worsen he is to contact us immediately.  Diabetic evaluation performed.  This note was created using [employer] voice recognition software that occasionally misinterpreted phrases or words.

## 2024-10-02 ENCOUNTER — OFFICE VISIT (OUTPATIENT)
Dept: PODIATRY | Facility: CLINIC | Age: 79
End: 2024-10-02
Payer: MEDICARE

## 2024-10-02 VITALS
BODY MASS INDEX: 27.08 KG/M2 | HEIGHT: 72 IN | HEART RATE: 65 BPM | WEIGHT: 199.94 LBS | SYSTOLIC BLOOD PRESSURE: 138 MMHG | DIASTOLIC BLOOD PRESSURE: 70 MMHG

## 2024-10-02 DIAGNOSIS — E10.9 COMPREHENSIVE DIABETIC FOOT EXAMINATION, TYPE 1 DM, ENCOUNTER FOR: ICD-10-CM

## 2024-10-02 DIAGNOSIS — M76.70 PERONEAL TENDONITIS, UNSPECIFIED LATERALITY: Primary | ICD-10-CM

## 2024-10-02 DIAGNOSIS — E11.9 TYPE 2 DIABETES MELLITUS WITHOUT COMPLICATION, WITHOUT LONG-TERM CURRENT USE OF INSULIN: ICD-10-CM

## 2024-10-02 PROCEDURE — 99999 PR PBB SHADOW E&M-EST. PATIENT-LVL V: CPT | Mod: PBBFAC,,, | Performed by: PODIATRIST

## 2024-10-02 PROCEDURE — 96372 THER/PROPH/DIAG INJ SC/IM: CPT | Mod: PBBFAC,59

## 2024-10-02 PROCEDURE — 99215 OFFICE O/P EST HI 40 MIN: CPT | Mod: PBBFAC | Performed by: PODIATRIST

## 2024-10-02 PROCEDURE — 99999PBSHW PR PBB SHADOW TECHNICAL ONLY FILED TO HB: Mod: PBBFAC,,,

## 2024-10-02 PROCEDURE — 99214 OFFICE O/P EST MOD 30 MIN: CPT | Mod: 25,S$PBB,, | Performed by: PODIATRIST

## 2024-10-02 RX ORDER — BETAMETHASONE SODIUM PHOSPHATE AND BETAMETHASONE ACETATE 3; 3 MG/ML; MG/ML
18 INJECTION, SUSPENSION INTRA-ARTICULAR; INTRALESIONAL; INTRAMUSCULAR; SOFT TISSUE
Status: COMPLETED | OUTPATIENT
Start: 2024-10-02 | End: 2024-10-02

## 2024-10-02 RX ORDER — KETOROLAC TROMETHAMINE 30 MG/ML
30 INJECTION, SOLUTION INTRAMUSCULAR; INTRAVENOUS
Status: COMPLETED | OUTPATIENT
Start: 2024-10-02 | End: 2024-10-02

## 2024-10-02 RX ORDER — MELOXICAM 7.5 MG/1
7.5 TABLET ORAL DAILY
Qty: 10 TABLET | Refills: 0 | Status: SHIPPED | OUTPATIENT
Start: 2024-10-02 | End: 2024-10-12

## 2024-10-02 RX ADMIN — KETOROLAC TROMETHAMINE 30 MG: 30 INJECTION, SOLUTION INTRAMUSCULAR at 03:10

## 2024-10-02 RX ADMIN — BETAMETHASONE SODIUM PHOSPHATE AND BETAMETHASONE ACETATE 18 MG: 3; 3 INJECTION, SUSPENSION INTRA-ARTICULAR; INTRALESIONAL; INTRAMUSCULAR at 03:10

## 2024-10-06 NOTE — PROGRESS NOTES
Subjective:       Patient ID: Дмитрий Baca is a 79 y.o. male.    Chief Complaint: Follow-up (Stress fracture of right foot)  Patient presents today he is a type 2 diabetic I have previously treated the patient for plantar fasciitis which he states is completely resolved and gone.  Patient  relates he has been having pain and inflammation on the outside of his right foot for about 1 1/2 months patient relates no trauma no injury to the area.    Past Medical History:   Diagnosis Date    A-fib     Diabetes mellitus, type 2     Hypertension     Sleep apnea      Past Surgical History:   Procedure Laterality Date    colonscopy       No family history on file.  Social History     Socioeconomic History    Marital status:    Tobacco Use    Smoking status: Never    Smokeless tobacco: Never   Substance and Sexual Activity    Alcohol use: Yes    Drug use: Never    Sexual activity: Not Currently       Current Outpatient Medications   Medication Sig Dispense Refill    amLODIPine (NORVASC) 5 MG tablet Be careful if taking OTCs.Take or use exactly as directed.      apixaban (ELIQUIS) 5 mg Tab 5 mg.      ascorbic acid, vitamin C, (VITAMIN C) 500 MG tablet Take 500 mg by mouth once daily.      atorvastatin (LIPITOR) 20 MG tablet atorvastatin 20 mg tablet   Take 1 tablet every day by oral route.      b complex vitamins capsule Take 1 capsule by mouth once daily.      calcitRIOL (ROCALTROL) 0.25 MCG Cap Take by mouth.      empagliflozin (JARDIANCE) 10 mg tablet 10 mg.      famotidine (PEPCID) 20 MG tablet Take 20 mg by mouth 2 (two) times daily.      finasteride (PROSCAR) 5 mg tablet Take 5 mg by mouth once daily.      furosemide (LASIX) 40 MG tablet Take orange juice or banana.Avoid exposure to sun.Take or use exactly as directed.      hydrALAZINE (APRESOLINE) 25 MG tablet Be careful if taking OTCs.Take or use exactly as directed.      omeprazole (PRILOSEC) 40 MG capsule   0 Refill(s), Maintenance      SYMBICORT 80-4.5  mcg/actuation HFAA Inhale into the lungs.      tadalafiL (CIALIS) 20 MG Tab Take 20 mg by mouth once daily.      triamcinolone acetonide 0.1% (KENALOG) 0.1 % cream       TRULICITY 0.75 mg/0.5 mL pen injector refrigerateCheck with your doctor before becoming pregnant.Store in original package.      vit C/E/Zn/coppr/lutein/zeaxan (PRESERVISION AREDS-2 ORAL) Take 1 tablet by mouth once daily.      vitamin D (VITAMIN D3) 1000 units Tab Take 1,000 Units by mouth 2 (two) times a day.      zinc acetate 25 mg (zinc) Cap   2 caps, Oral, Daily, on an empty stomach 1 hour before or 2 hours after eating, # 250 cap, 0 Refill(s), Maintenance      diclofenac sodium (VOLTAREN) 1 % Gel Apply 1 % topically.      meloxicam (MOBIC) 7.5 MG tablet Take 1 tablet (7.5 mg total) by mouth once daily. for 10 days 10 tablet 0    telmisartan (MICARDIS) 80 MG Tab 80 mg.       No current facility-administered medications for this visit.     Review of patient's allergies indicates:  No Known Allergies    Review of Systems   Musculoskeletal:  Positive for arthralgias.   All other systems reviewed and are negative.      Objective:      Vitals:    10/02/24 1436   BP: 138/70   Pulse: 65   Weight: 90.7 kg (199 lb 15.3 oz)   Height: 6' (1.829 m)     Physical Exam  Vitals and nursing note reviewed.   Constitutional:       Appearance: Normal appearance. He is well-developed.   Cardiovascular:      Pulses:           Dorsalis pedis pulses are 1+ on the right side and 1+ on the left side.        Posterior tibial pulses are 1+ on the right side and 1+ on the left side.   Pulmonary:      Effort: Pulmonary effort is normal.   Musculoskeletal:         General: Tenderness present.      Right foot: Deformity present.        Feet:    Feet:      Right foot:      Protective Sensation: 4 sites tested.  3 sites sensed.      Skin integrity: Erythema and dry skin present. No ulcer.      Left foot:      Protective Sensation: 4 sites tested.  3 sites sensed.      Skin  integrity: Dry skin present.   Skin:     Capillary Refill: Capillary refill takes 2 to 3 seconds.      Findings: Erythema present.   Neurological:      General: No focal deficit present.      Mental Status: He is alert.   Psychiatric:         Mood and Affect: Mood normal.         Behavior: Behavior normal.         Thought Content: Thought content normal.         Judgment: Judgment normal.                                                                  Assessment:       1. Peroneal tendonitis, unspecified laterality    2. Type 2 diabetes mellitus without complication, without long-term current use of insulin    3. Comprehensive diabetic foot examination, type 1 DM, encounter for        Plan:       Patient presents today he is a type 2 diabetic I have previously treated the patient for plantar fasciitis which he states is completely resolved and gone.  Patient  relates he has been having pain and inflammation on the outside of his right foot for about 1 1/2 months patient relates no trauma no injury to the area.   On evaluation patient has positive continued erythema positive edema overlying the styloid process base of the 5th metatarsal right has discomfort upon palpation with some callus tissue around this area findings are consistent with peroneal tendinitis.  Plain film x-rays were reviewed with the patient I showed the patient how he has a small bone chip at the insertion of the peroneus brevis I advised him this does not appear consistent with a stress fracture this is likely a bone spur that fractured possibly even accessory ossicle that he was born width.  This certainly could be a contributing factor to his tendinitis he has been doing the Voltaren gel it has not really helped he still having significant discomfort I have started the patient on Mobic 7.5 mg low dose to try to get this inflammation down he is going to be going on an extended vacation to [place] he will probably have to do a lot of walking I  did offer the patient a fracture boot but I knew that would be difficult with travels states he does not think that will be helpful as he is going to have to pushes wife in a wheelchair through the airports.  Patient was administered an IM injection of Celestone right side Toradol left is going to continue using the Voltaren gel use the meloxicam continue with the appropriate arch support and I plan to see him for follow-up once he gets back.  Patient advised he definitely has to make sure he takes it easy while he is traveling and does not overdo it.  Ultimately the boot would be the best thing for the patient to be in with his travels but I understand this is not very convenient or practical and could actually make things difficult trying to transport his wife around in a wheelchair.  Diabetic evaluation performed.  This note was created using [employer] voice recognition software that occasionally misinterpreted phrases or words.

## 2024-10-15 ENCOUNTER — PATIENT MESSAGE (OUTPATIENT)
Dept: RESEARCH | Facility: HOSPITAL | Age: 79
End: 2024-10-15
Payer: MEDICARE

## 2024-10-31 ENCOUNTER — OFFICE VISIT (OUTPATIENT)
Dept: PODIATRY | Facility: CLINIC | Age: 79
End: 2024-10-31
Payer: MEDICARE

## 2024-10-31 VITALS
WEIGHT: 199.94 LBS | BODY MASS INDEX: 27.08 KG/M2 | SYSTOLIC BLOOD PRESSURE: 141 MMHG | DIASTOLIC BLOOD PRESSURE: 64 MMHG | HEART RATE: 68 BPM | HEIGHT: 72 IN

## 2024-10-31 DIAGNOSIS — M76.70 PERONEAL TENDONITIS, UNSPECIFIED LATERALITY: Primary | ICD-10-CM

## 2024-10-31 DIAGNOSIS — E10.9 COMPREHENSIVE DIABETIC FOOT EXAMINATION, TYPE 1 DM, ENCOUNTER FOR: ICD-10-CM

## 2024-10-31 DIAGNOSIS — E11.9 TYPE 2 DIABETES MELLITUS WITHOUT COMPLICATION, WITHOUT LONG-TERM CURRENT USE OF INSULIN: ICD-10-CM

## 2024-10-31 PROCEDURE — 99214 OFFICE O/P EST MOD 30 MIN: CPT | Mod: S$PBB,,, | Performed by: PODIATRIST

## 2024-10-31 PROCEDURE — 99999 PR PBB SHADOW E&M-EST. PATIENT-LVL IV: CPT | Mod: PBBFAC,,, | Performed by: PODIATRIST

## 2024-10-31 PROCEDURE — 99214 OFFICE O/P EST MOD 30 MIN: CPT | Mod: PBBFAC,PN | Performed by: PODIATRIST

## 2024-10-31 RX ORDER — MELOXICAM 7.5 MG/1
7.5 TABLET ORAL DAILY
Qty: 10 TABLET | Refills: 0 | Status: SHIPPED | OUTPATIENT
Start: 2024-10-31 | End: 2024-11-10

## 2024-11-03 NOTE — PROGRESS NOTES
Subjective:       Patient ID: Дмитрий Baca is a 79 y.o. male.    Chief Complaint: No chief complaint on file.  Patient presents today he is a type 2 diabetic who I most recently treated for peroneal tendinitis right.    Past Medical History:   Diagnosis Date    A-fib     Diabetes mellitus, type 2     Hypertension     Sleep apnea      Past Surgical History:   Procedure Laterality Date    colonscopy       No family history on file.  Social History     Socioeconomic History    Marital status:    Tobacco Use    Smoking status: Never    Smokeless tobacco: Never   Substance and Sexual Activity    Alcohol use: Yes    Drug use: Never    Sexual activity: Not Currently       Current Outpatient Medications   Medication Sig Dispense Refill    amLODIPine (NORVASC) 5 MG tablet Be careful if taking OTCs.Take or use exactly as directed.      apixaban (ELIQUIS) 5 mg Tab 5 mg.      ascorbic acid, vitamin C, (VITAMIN C) 500 MG tablet Take 500 mg by mouth once daily.      atorvastatin (LIPITOR) 20 MG tablet atorvastatin 20 mg tablet   Take 1 tablet every day by oral route.      b complex vitamins capsule Take 1 capsule by mouth once daily.      calcitRIOL (ROCALTROL) 0.25 MCG Cap Take by mouth.      diclofenac sodium (VOLTAREN) 1 % Gel Apply 1 % topically.      empagliflozin (JARDIANCE) 10 mg tablet 10 mg.      famotidine (PEPCID) 20 MG tablet Take 20 mg by mouth 2 (two) times daily.      finasteride (PROSCAR) 5 mg tablet Take 5 mg by mouth once daily.      furosemide (LASIX) 40 MG tablet Take orange juice or banana.Avoid exposure to sun.Take or use exactly as directed.      hydrALAZINE (APRESOLINE) 25 MG tablet Be careful if taking OTCs.Take or use exactly as directed.      omeprazole (PRILOSEC) 40 MG capsule   0 Refill(s), Maintenance      SYMBICORT 80-4.5 mcg/actuation HFAA Inhale into the lungs.      tadalafiL (CIALIS) 20 MG Tab Take 20 mg by mouth once daily.      triamcinolone acetonide 0.1% (KENALOG) 0.1 % cream        TRULICITY 0.75 mg/0.5 mL pen injector refrigerateCheck with your doctor before becoming pregnant.Store in original package.      vit C/E/Zn/coppr/lutein/zeaxan (PRESERVISION AREDS-2 ORAL) Take 1 tablet by mouth once daily.      vitamin D (VITAMIN D3) 1000 units Tab Take 1,000 Units by mouth 2 (two) times a day.      zinc acetate 25 mg (zinc) Cap   2 caps, Oral, Daily, on an empty stomach 1 hour before or 2 hours after eating, # 250 cap, 0 Refill(s), Maintenance      meloxicam (MOBIC) 7.5 MG tablet Take 1 tablet (7.5 mg total) by mouth once daily. for 10 days 10 tablet 0    telmisartan (MICARDIS) 80 MG Tab 80 mg.       No current facility-administered medications for this visit.     Review of patient's allergies indicates:  No Known Allergies    Review of Systems   Musculoskeletal:  Positive for arthralgias.   All other systems reviewed and are negative.      Objective:      Vitals:    10/31/24 1341   BP: (!) 141/64   BP Location: Left arm   Patient Position: Sitting   Pulse: 68   Weight: 90.7 kg (199 lb 15.3 oz)   Height: 6' (1.829 m)     Physical Exam  Vitals and nursing note reviewed.   Constitutional:       Appearance: Normal appearance. He is well-developed.   Cardiovascular:      Pulses:           Dorsalis pedis pulses are 1+ on the right side and 1+ on the left side.        Posterior tibial pulses are 1+ on the right side and 1+ on the left side.   Pulmonary:      Effort: Pulmonary effort is normal.   Musculoskeletal:         General: Tenderness present.      Right foot: Deformity present.        Feet:    Feet:      Right foot:      Protective Sensation: 4 sites tested.  3 sites sensed.      Skin integrity: Erythema and dry skin present. No ulcer.      Left foot:      Protective Sensation: 4 sites tested.  3 sites sensed.      Skin integrity: Dry skin present.   Skin:     Capillary Refill: Capillary refill takes 2 to 3 seconds.      Findings: Erythema present.   Neurological:      General: No focal deficit  present.      Mental Status: He is alert.   Psychiatric:         Mood and Affect: Mood normal.         Behavior: Behavior normal.         Thought Content: Thought content normal.         Judgment: Judgment normal.            Assessment:       1. Peroneal tendonitis, unspecified laterality    2. Type 2 diabetes mellitus without complication, without long-term current use of insulin    3. Comprehensive diabetic foot examination, type 1 DM, encounter for        Plan:       Patient presents today he is a type 2 diabetic who I most recently treated for peroneal tendinitis right.  Patient was recently out of town traveling he states while he was gone his foot was great the last time I saw him I had given him an IM injection of Celestone and Toradol IM.  Patient states he was doing great however he thinks the injections have worn off as his pain has returned.  Patient is very active he never sits still he plays golf on a regular basis he is constantly walking did not  the meloxicam there was some confusion he states he is not sure that the pharmacy got it although I did confirm on the computer that it was sent and received by the pharmacy.  Patient advised I do want him to start the meloxicam 7.5 mg he is going to use Voltaren gel I advised the patient we have to be more aggressive with the arch support I dispensed the patient some power step control arch supports I have added additional blue arch padding for extra support I want see the patient for follow-up in 2 weeks and re-evaluation he should be considerably improved with the meloxicam and the increased support.  Patient states he did do the treadmill on Monday and that may have aggravated his right foot.  Diabetic evaluation performed.  This note was created using [employer] voice recognition software that occasionally misinterpreted phrases or words.

## 2024-11-14 ENCOUNTER — OFFICE VISIT (OUTPATIENT)
Dept: PODIATRY | Facility: CLINIC | Age: 79
End: 2024-11-14
Payer: MEDICARE

## 2024-11-14 VITALS
SYSTOLIC BLOOD PRESSURE: 138 MMHG | HEIGHT: 72 IN | DIASTOLIC BLOOD PRESSURE: 72 MMHG | HEART RATE: 78 BPM | BODY MASS INDEX: 27.08 KG/M2 | WEIGHT: 199.94 LBS

## 2024-11-14 DIAGNOSIS — M76.70 PERONEAL TENDONITIS, UNSPECIFIED LATERALITY: Primary | ICD-10-CM

## 2024-11-14 DIAGNOSIS — L97.521 ULCER OF LEFT FOOT, LIMITED TO BREAKDOWN OF SKIN: ICD-10-CM

## 2024-11-14 DIAGNOSIS — E10.9 COMPREHENSIVE DIABETIC FOOT EXAMINATION, TYPE 1 DM, ENCOUNTER FOR: ICD-10-CM

## 2024-11-14 DIAGNOSIS — E11.9 TYPE 2 DIABETES MELLITUS WITHOUT COMPLICATION, WITHOUT LONG-TERM CURRENT USE OF INSULIN: ICD-10-CM

## 2024-11-14 PROCEDURE — 99213 OFFICE O/P EST LOW 20 MIN: CPT | Mod: S$PBB,,, | Performed by: PODIATRIST

## 2024-11-14 PROCEDURE — 99999 PR PBB SHADOW E&M-EST. PATIENT-LVL V: CPT | Mod: PBBFAC,,, | Performed by: PODIATRIST

## 2024-11-14 PROCEDURE — 99215 OFFICE O/P EST HI 40 MIN: CPT | Mod: PBBFAC,PN | Performed by: PODIATRIST

## 2024-11-14 RX ORDER — ALFUZOSIN HYDROCHLORIDE 10 MG/1
TABLET, EXTENDED RELEASE ORAL
COMMUNITY
Start: 2024-11-08

## 2024-11-14 RX ORDER — MELOXICAM 7.5 MG/1
7.5 TABLET ORAL DAILY PRN
Qty: 30 TABLET | Refills: 1 | Status: SHIPPED | OUTPATIENT
Start: 2024-11-14 | End: 2025-01-13

## 2024-11-17 NOTE — PROGRESS NOTES
Subjective:       Patient ID: Дмитрий Baca is a 79 y.o. male.    Chief Complaint: Tendonitis  Patient presents today he is a type 2 diabetic who I most recently treated for peroneal tendinitis right.    Past Medical History:   Diagnosis Date    A-fib     Diabetes mellitus, type 2     Hypertension     Sleep apnea      Past Surgical History:   Procedure Laterality Date    colonscopy       No family history on file.  Social History     Socioeconomic History    Marital status:    Tobacco Use    Smoking status: Never    Smokeless tobacco: Never   Substance and Sexual Activity    Alcohol use: Yes    Drug use: Never    Sexual activity: Not Currently       Current Outpatient Medications   Medication Sig Dispense Refill    alfuzosin (UROXATRAL) 10 mg Tb24       amLODIPine (NORVASC) 5 MG tablet Be careful if taking OTCs.Take or use exactly as directed.      apixaban (ELIQUIS) 5 mg Tab 5 mg.      ascorbic acid, vitamin C, (VITAMIN C) 500 MG tablet Take 500 mg by mouth once daily.      atorvastatin (LIPITOR) 20 MG tablet atorvastatin 20 mg tablet   Take 1 tablet every day by oral route.      b complex vitamins capsule Take 1 capsule by mouth once daily.      calcitRIOL (ROCALTROL) 0.25 MCG Cap Take by mouth.      diclofenac sodium (VOLTAREN) 1 % Gel Apply 1 % topically.      empagliflozin (JARDIANCE) 10 mg tablet 10 mg.      famotidine (PEPCID) 20 MG tablet Take 20 mg by mouth 2 (two) times daily.      finasteride (PROSCAR) 5 mg tablet Take 5 mg by mouth once daily.      furosemide (LASIX) 40 MG tablet Take orange juice or banana.Avoid exposure to sun.Take or use exactly as directed.      hydrALAZINE (APRESOLINE) 25 MG tablet Be careful if taking OTCs.Take or use exactly as directed.      omeprazole (PRILOSEC) 40 MG capsule   0 Refill(s), Maintenance      SYMBICORT 80-4.5 mcg/actuation HFAA Inhale into the lungs.      tadalafiL (CIALIS) 20 MG Tab Take 20 mg by mouth once daily.      telmisartan (MICARDIS) 80 MG Tab  80 mg.      triamcinolone acetonide 0.1% (KENALOG) 0.1 % cream       TRULICITY 0.75 mg/0.5 mL pen injector refrigerateCheck with your doctor before becoming pregnant.Store in original package.      vit C/E/Zn/coppr/lutein/zeaxan (PRESERVISION AREDS-2 ORAL) Take 1 tablet by mouth once daily.      vitamin D (VITAMIN D3) 1000 units Tab Take 1,000 Units by mouth 2 (two) times a day.      zinc acetate 25 mg (zinc) Cap   2 caps, Oral, Daily, on an empty stomach 1 hour before or 2 hours after eating, # 250 cap, 0 Refill(s), Maintenance      meloxicam (MOBIC) 7.5 MG tablet Take 1 tablet (7.5 mg total) by mouth daily as needed for Pain. 30 tablet 1     No current facility-administered medications for this visit.     Review of patient's allergies indicates:  No Known Allergies    Review of Systems   Musculoskeletal:  Positive for arthralgias.   All other systems reviewed and are negative.      Objective:      Vitals:    11/14/24 0802   BP: 138/72   BP Location: Right arm   Patient Position: Sitting   Pulse: 78   Weight: 90.7 kg (199 lb 15.3 oz)   Height: 6' (1.829 m)     Physical Exam  Vitals and nursing note reviewed.   Constitutional:       Appearance: Normal appearance. He is well-developed.   Cardiovascular:      Pulses:           Dorsalis pedis pulses are 1+ on the right side and 1+ on the left side.        Posterior tibial pulses are 1+ on the right side and 1+ on the left side.   Pulmonary:      Effort: Pulmonary effort is normal.   Musculoskeletal:         General: Tenderness present.      Right foot: Deformity present.        Feet:    Feet:      Right foot:      Protective Sensation: 4 sites tested.  3 sites sensed.      Skin integrity: Erythema and dry skin present. No ulcer.      Left foot:      Protective Sensation: 4 sites tested.  3 sites sensed.      Skin integrity: Dry skin present.   Skin:     Capillary Refill: Capillary refill takes 2 to 3 seconds.      Findings: Erythema present.   Neurological:       General: No focal deficit present.      Mental Status: He is alert.   Psychiatric:         Mood and Affect: Mood normal.         Behavior: Behavior normal.         Thought Content: Thought content normal.         Judgment: Judgment normal.                      Assessment:       1. Peroneal tendonitis, unspecified laterality    2. Type 2 diabetes mellitus without complication, without long-term current use of insulin    3. Comprehensive diabetic foot examination, type 1 DM, encounter for    4. Ulcer of left foot, limited to breakdown of skin        Plan:       Patient presents today he is a type 2 diabetic who I most recently treated for peroneal tendinitis right.  Patient states he is doing great he relates that the power step arch supports have made a considerable difference he also has been taking the meloxicam which he believes has helped also.  I did provide the patient a new prescription for meloxicam he had a small pre ulcerative area 5th right right.  Following debridement of the area I did dispense the patient some Silipos toe spacers to prevent rubbing and irritation of the 5th digit right.  I did advised the patient that I do feel that he needs a little bit more support on the power steps an added a large blue arch pad bilateral.  Patient advised the increased arches obviously taking some of the pressure off of the peroneal tendons which has relieved the inflammation in his peroneal tendinitis he has completely pain-free currently.  Patient advised if down the road he starts having any pain discomfort consistent with his previously noted peroneal tendinitis we may have to increase the amount of support he is getting and I will see him as needed for follow-up.  Diabetic evaluation performed.  This note was created using [employer] voice recognition software that occasionally misinterpreted phrases or words.

## 2025-01-08 RX ORDER — MELOXICAM 7.5 MG/1
7.5 TABLET ORAL DAILY PRN
Qty: 30 TABLET | Refills: 1 | Status: SHIPPED | OUTPATIENT
Start: 2025-01-08

## 2025-01-08 NOTE — TELEPHONE ENCOUNTER
Refill request for meloxicam 7.5 mg tablets sent to [facility] in [place].     Last office visit was 11/14/2024.    RENY Duong 01/08/2025

## 2025-03-05 RX ORDER — MELOXICAM 7.5 MG/1
7.5 TABLET ORAL DAILY PRN
Qty: 30 TABLET | Refills: 1 | Status: SHIPPED | OUTPATIENT
Start: 2025-03-05

## 2025-04-30 ENCOUNTER — OFFICE VISIT (OUTPATIENT)
Dept: PODIATRY | Facility: CLINIC | Age: 80
End: 2025-04-30
Payer: MEDICARE

## 2025-04-30 VITALS
SYSTOLIC BLOOD PRESSURE: 147 MMHG | HEART RATE: 118 BPM | HEIGHT: 72 IN | BODY MASS INDEX: 27.08 KG/M2 | WEIGHT: 199.94 LBS | DIASTOLIC BLOOD PRESSURE: 87 MMHG

## 2025-04-30 DIAGNOSIS — L97.521 ULCER OF LEFT FOOT, LIMITED TO BREAKDOWN OF SKIN: ICD-10-CM

## 2025-04-30 DIAGNOSIS — S99.921A INJURY OF RIGHT GREAT TOE, INITIAL ENCOUNTER: Primary | ICD-10-CM

## 2025-04-30 DIAGNOSIS — E11.9 TYPE 2 DIABETES MELLITUS WITHOUT COMPLICATION, WITHOUT LONG-TERM CURRENT USE OF INSULIN: ICD-10-CM

## 2025-04-30 DIAGNOSIS — E10.9 COMPREHENSIVE DIABETIC FOOT EXAMINATION, TYPE 1 DM, ENCOUNTER FOR: ICD-10-CM

## 2025-04-30 PROCEDURE — 99215 OFFICE O/P EST HI 40 MIN: CPT | Mod: PBBFAC | Performed by: PODIATRIST

## 2025-04-30 PROCEDURE — 99213 OFFICE O/P EST LOW 20 MIN: CPT | Mod: S$PBB,,, | Performed by: PODIATRIST

## 2025-04-30 PROCEDURE — 99999 PR PBB SHADOW E&M-EST. PATIENT-LVL V: CPT | Mod: PBBFAC,,, | Performed by: PODIATRIST

## 2025-04-30 RX ORDER — DULAGLUTIDE 0.75 MG/.5ML
INJECTION, SOLUTION SUBCUTANEOUS
COMMUNITY
Start: 2025-03-28

## 2025-04-30 RX ORDER — LINEZOLID 600 MG/1
600 TABLET, FILM COATED ORAL 2 TIMES DAILY
COMMUNITY
Start: 2025-04-29

## 2025-05-03 PROBLEM — S99.921A INJURY OF RIGHT GREAT TOE: Status: ACTIVE | Noted: 2025-05-03

## 2025-05-04 NOTE — PROGRESS NOTES
Subjective:       Patient ID: Дмитрий Baca is a 79 y.o. male.    Chief Complaint: No chief complaint on file.  Patient presents today he is a type 2 diabetic who I most recently injured his left great toe he states that on April 15th while coming down the attic stairs he missed 1 of the steps and fell.  Patient also has area of discomfort between the 4th and 5th digits right.  Patient is a type 2 diabetic.    Past Medical History:   Diagnosis Date    A-fib     Diabetes mellitus, type 2     Hypertension     Sleep apnea      Past Surgical History:   Procedure Laterality Date    colonscopy       No family history on file.  Social History     Socioeconomic History    Marital status:    Tobacco Use    Smoking status: Never    Smokeless tobacco: Never   Substance and Sexual Activity    Alcohol use: Yes    Drug use: Never    Sexual activity: Not Currently       Current Outpatient Medications   Medication Sig Dispense Refill    alfuzosin (UROXATRAL) 10 mg Tb24       apixaban (ELIQUIS) 5 mg Tab 5 mg.      ascorbic acid, vitamin C, (VITAMIN C) 500 MG tablet Take 500 mg by mouth once daily.      atorvastatin (LIPITOR) 20 MG tablet atorvastatin 20 mg tablet   Take 1 tablet every day by oral route.      b complex vitamins capsule Take 1 capsule by mouth once daily.      calcitRIOL (ROCALTROL) 0.25 MCG Cap Take by mouth.      empagliflozin (JARDIANCE) 10 mg tablet 10 mg.      famotidine (PEPCID) 20 MG tablet Take 20 mg by mouth 2 (two) times daily.      finasteride (PROSCAR) 5 mg tablet Take 5 mg by mouth once daily.      hydrALAZINE (APRESOLINE) 25 MG tablet Be careful if taking OTCs.Take or use exactly as directed.      linezolid (ZYVOX) 600 mg Tab Take 600 mg by mouth 2 (two) times daily.      meloxicam (MOBIC) 7.5 MG tablet TAKE 1 TABLET BY MOUTH DAILY AS NEEDED FOR PAIN 30 tablet 1    omeprazole (PRILOSEC) 40 MG capsule   0 Refill(s), Maintenance      SYMBICORT 80-4.5 mcg/actuation HFAA Inhale into the lungs.       tadalafiL (CIALIS) 20 MG Tab Take 20 mg by mouth once daily.      triamcinolone acetonide 0.1% (KENALOG) 0.1 % cream       TRULICITY 0.75 mg/0.5 mL pen injector Inject into the skin.      vit C/E/Zn/coppr/lutein/zeaxan (PRESERVISION AREDS-2 ORAL) Take 1 tablet by mouth once daily.      vitamin D (VITAMIN D3) 1000 units Tab Take 1,000 Units by mouth 2 (two) times a day.      zinc acetate 25 mg (zinc) Cap   2 caps, Oral, Daily, on an empty stomach 1 hour before or 2 hours after eating, # 250 cap, 0 Refill(s), Maintenance      amLODIPine (NORVASC) 5 MG tablet Be careful if taking OTCs.Take or use exactly as directed.      diclofenac sodium (VOLTAREN) 1 % Gel Apply 1 % topically.      furosemide (LASIX) 40 MG tablet Take orange juice or banana.Avoid exposure to sun.Take or use exactly as directed.      telmisartan (MICARDIS) 80 MG Tab 80 mg.       No current facility-administered medications for this visit.     Review of patient's allergies indicates:  No Known Allergies    Review of Systems   Musculoskeletal:  Positive for arthralgias.   All other systems reviewed and are negative.      Objective:      Vitals:    04/30/25 1430   BP: (!) 147/87   Pulse: (!) 118   Weight: 90.7 kg (199 lb 15.3 oz)   Height: 6' (1.829 m)     Physical Exam  Vitals and nursing note reviewed.   Constitutional:       Appearance: Normal appearance. He is well-developed.   Cardiovascular:      Pulses:           Dorsalis pedis pulses are 1+ on the right side and 1+ on the left side.        Posterior tibial pulses are 1+ on the right side and 1+ on the left side.   Pulmonary:      Effort: Pulmonary effort is normal.   Musculoskeletal:         General: Tenderness present.      Right foot: Deformity present.        Feet:    Feet:      Right foot:      Protective Sensation: 4 sites tested.  3 sites sensed.      Skin integrity: Erythema and dry skin present. No ulcer.      Left foot:      Protective Sensation: 4 sites tested.  3 sites sensed.       Skin integrity: Dry skin present.   Skin:     Capillary Refill: Capillary refill takes 2 to 3 seconds.      Findings: Erythema present.   Neurological:      General: No focal deficit present.      Mental Status: He is alert.   Psychiatric:         Mood and Affect: Mood normal.         Behavior: Behavior normal.         Thought Content: Thought content normal.         Judgment: Judgment normal.                                    Assessment:       1. Injury of right great toe, initial encounter    2. Ulcer of left foot, limited to breakdown of skin    3. Type 2 diabetes mellitus without complication, without long-term current use of insulin    4. Comprehensive diabetic foot examination, type 1 DM, encounter for        Plan:       Patient presents today he is a type 2 diabetic who I most recently injured his left great toe he states that on April 15th while coming down the attic stairs he missed 1 of the steps and fell.  Patient also has area of discomfort between the 4th and 5th digits right.  Patient is a type 2 diabetic.  Patient states the previously treated peroneal tendinitis has been doing great it has not caused him any pain or discomfort.  Patient does have a hematoma underlying the left hallux secondary to trauma I was able to trim the portion of the hallux nail that was loose which allowed me to drain a lot of the blood from underneath the toenail I advised the patient this will continue to drain which is a good thing he needs to keep a Band-Aid on the area and monitor this closely any changes to the area if he notices any redness swelling or drainage other than bloody drainage she needs to contact us immediately.  Patient has a pre ulcerative area on the medial aspect of the 5th digit right where it rubs against the 4th digit I did debride this area and the patient stated felt much better it did not appear to be infected patient was dispensed different types of Silipos toe spacers that he can use between  the toes to prevent rubbing and irritation.  Recommended follow-up will be as needed patient advised to contact us with any problems questions or concerns prior to regular diabetic evaluation.  Diabetic evaluation performed.  This note was created using [employer] voice recognition software that occasionally misinterpreted phrases or words.